# Patient Record
Sex: MALE | Race: WHITE | NOT HISPANIC OR LATINO | Employment: OTHER | URBAN - METROPOLITAN AREA
[De-identification: names, ages, dates, MRNs, and addresses within clinical notes are randomized per-mention and may not be internally consistent; named-entity substitution may affect disease eponyms.]

---

## 2023-06-30 ENCOUNTER — TELEPHONE (OUTPATIENT)
Dept: PRIMARY CARE | Facility: CLINIC | Age: 68
End: 2023-06-30
Payer: MEDICARE

## 2023-06-30 NOTE — TELEPHONE ENCOUNTER
Body aches, temperature 100, negative covid 19 test last night, full taste and full sense smell, no Nausea Vomiting or Diarhhea, sinus congestion  Tylenol and Advil not working asking [facility] in [place]

## 2023-09-05 ENCOUNTER — TELEPHONE (OUTPATIENT)
Dept: PRIMARY CARE | Facility: CLINIC | Age: 68
End: 2023-09-05
Payer: MEDICARE

## 2023-09-05 DIAGNOSIS — E78.5 HYPERLIPIDEMIA, UNSPECIFIED HYPERLIPIDEMIA TYPE: ICD-10-CM

## 2023-09-05 DIAGNOSIS — E11.65 TYPE 2 DIABETES MELLITUS WITH HYPERGLYCEMIA, WITHOUT LONG-TERM CURRENT USE OF INSULIN (MULTI): Primary | ICD-10-CM

## 2023-09-05 DIAGNOSIS — F41.9 ANXIETY: ICD-10-CM

## 2023-09-05 DIAGNOSIS — Z12.5 SCREENING FOR PROSTATE CANCER: ICD-10-CM

## 2023-09-05 NOTE — TELEPHONE ENCOUNTER
Patient called the answering service this morning upset because he went to the lab to have labs drawn and there was no active orders in the system-the orders from his visit 2.2023 were cancelled and patient was advised of that. Do you want him to have labs drawn? If so can you put them in and do you need him to fast?

## 2023-09-06 ENCOUNTER — LAB (OUTPATIENT)
Dept: LAB | Facility: LAB | Age: 68
End: 2023-09-06
Payer: MEDICARE

## 2023-09-06 DIAGNOSIS — F41.9 ANXIETY: ICD-10-CM

## 2023-09-06 DIAGNOSIS — E11.65 TYPE 2 DIABETES MELLITUS WITH HYPERGLYCEMIA, WITHOUT LONG-TERM CURRENT USE OF INSULIN (MULTI): ICD-10-CM

## 2023-09-06 DIAGNOSIS — Z12.5 SCREENING FOR PROSTATE CANCER: ICD-10-CM

## 2023-09-06 DIAGNOSIS — E78.5 HYPERLIPIDEMIA, UNSPECIFIED HYPERLIPIDEMIA TYPE: ICD-10-CM

## 2023-09-06 LAB
ALANINE AMINOTRANSFERASE (SGPT) (U/L) IN SER/PLAS: 31 U/L (ref 10–52)
ALBUMIN (G/DL) IN SER/PLAS: 4.6 G/DL (ref 3.4–5)
ALBUMIN (MG/L) IN URINE: <7 MG/L
ALBUMIN/CREATININE (UG/MG) IN URINE: NORMAL UG/MG CRT (ref 0–30)
ALKALINE PHOSPHATASE (U/L) IN SER/PLAS: 78 U/L (ref 33–136)
ANION GAP IN SER/PLAS: 16 MMOL/L (ref 10–20)
ASPARTATE AMINOTRANSFERASE (SGOT) (U/L) IN SER/PLAS: 27 U/L (ref 9–39)
BILIRUBIN TOTAL (MG/DL) IN SER/PLAS: 0.5 MG/DL (ref 0–1.2)
CALCIUM (MG/DL) IN SER/PLAS: 9.5 MG/DL (ref 8.6–10.3)
CARBON DIOXIDE, TOTAL (MMOL/L) IN SER/PLAS: 25 MMOL/L (ref 21–32)
CHLORIDE (MMOL/L) IN SER/PLAS: 103 MMOL/L (ref 98–107)
CHOLESTEROL (MG/DL) IN SER/PLAS: 226 MG/DL (ref 0–199)
CHOLESTEROL IN HDL (MG/DL) IN SER/PLAS: 41.8 MG/DL
CHOLESTEROL/HDL RATIO: 5.4
CREATININE (MG/DL) IN SER/PLAS: 1.31 MG/DL (ref 0.5–1.3)
CREATININE (MG/DL) IN URINE: 90.4 MG/DL (ref 20–370)
ESTIMATED AVERAGE GLUCOSE FOR HBA1C: 137 MG/DL
GFR MALE: 59 ML/MIN/1.73M2
GLUCOSE (MG/DL) IN SER/PLAS: 115 MG/DL (ref 74–99)
HEMOGLOBIN A1C/HEMOGLOBIN TOTAL IN BLOOD: 6.4 %
LDL: 153 MG/DL (ref 0–99)
POTASSIUM (MMOL/L) IN SER/PLAS: 4.1 MMOL/L (ref 3.5–5.3)
PROSTATE SPECIFIC AG (NG/ML) IN SER/PLAS: 1.49 NG/ML (ref 0–4)
PROTEIN TOTAL: 7.3 G/DL (ref 6.4–8.2)
SODIUM (MMOL/L) IN SER/PLAS: 140 MMOL/L (ref 136–145)
THYROTROPIN (MIU/L) IN SER/PLAS BY DETECTION LIMIT <= 0.05 MIU/L: 1.44 MIU/L (ref 0.44–3.98)
TRIGLYCERIDE (MG/DL) IN SER/PLAS: 154 MG/DL (ref 0–149)
UREA NITROGEN (MG/DL) IN SER/PLAS: 23 MG/DL (ref 6–23)
VLDL: 31 MG/DL (ref 0–40)

## 2023-09-06 PROCEDURE — 80061 LIPID PANEL: CPT

## 2023-09-06 PROCEDURE — G0103 PSA SCREENING: HCPCS

## 2023-09-06 PROCEDURE — 80053 COMPREHEN METABOLIC PANEL: CPT

## 2023-09-06 PROCEDURE — 36415 COLL VENOUS BLD VENIPUNCTURE: CPT

## 2023-09-06 PROCEDURE — 82043 UR ALBUMIN QUANTITATIVE: CPT

## 2023-09-06 PROCEDURE — 83036 HEMOGLOBIN GLYCOSYLATED A1C: CPT

## 2023-09-06 PROCEDURE — 82570 ASSAY OF URINE CREATININE: CPT

## 2023-09-06 PROCEDURE — 84443 ASSAY THYROID STIM HORMONE: CPT

## 2023-09-07 ENCOUNTER — OFFICE VISIT (OUTPATIENT)
Dept: PRIMARY CARE | Facility: CLINIC | Age: 68
End: 2023-09-07
Payer: MEDICARE

## 2023-09-07 VITALS
HEIGHT: 67 IN | RESPIRATION RATE: 16 BRPM | HEART RATE: 78 BPM | BODY MASS INDEX: 33.24 KG/M2 | SYSTOLIC BLOOD PRESSURE: 126 MMHG | OXYGEN SATURATION: 98 % | WEIGHT: 211.8 LBS | DIASTOLIC BLOOD PRESSURE: 78 MMHG

## 2023-09-07 DIAGNOSIS — E55.9 VITAMIN D DEFICIENCY: ICD-10-CM

## 2023-09-07 DIAGNOSIS — Z71.89 ADVANCE DIRECTIVE DISCUSSED WITH PATIENT: ICD-10-CM

## 2023-09-07 DIAGNOSIS — Z91.030 BEE STING ALLERGY: ICD-10-CM

## 2023-09-07 DIAGNOSIS — K21.9 GASTROESOPHAGEAL REFLUX DISEASE WITHOUT ESOPHAGITIS: ICD-10-CM

## 2023-09-07 DIAGNOSIS — Z00.00 MEDICARE ANNUAL WELLNESS VISIT, SUBSEQUENT: Primary | ICD-10-CM

## 2023-09-07 DIAGNOSIS — E78.5 HYPERLIPIDEMIA, UNSPECIFIED HYPERLIPIDEMIA TYPE: ICD-10-CM

## 2023-09-07 DIAGNOSIS — I10 PRIMARY HYPERTENSION: ICD-10-CM

## 2023-09-07 DIAGNOSIS — B00.9 HERPES: ICD-10-CM

## 2023-09-07 DIAGNOSIS — E11.65 TYPE 2 DIABETES MELLITUS WITH HYPERGLYCEMIA, WITHOUT LONG-TERM CURRENT USE OF INSULIN (MULTI): ICD-10-CM

## 2023-09-07 PROBLEM — K57.92 ACUTE DIVERTICULITIS: Status: ACTIVE | Noted: 2023-09-07

## 2023-09-07 PROBLEM — R53.83 FATIGUE: Status: ACTIVE | Noted: 2023-09-07

## 2023-09-07 PROBLEM — E66.811 OBESITY (BMI 30.0-34.9): Status: ACTIVE | Noted: 2023-09-07

## 2023-09-07 PROBLEM — E11.9 DIABETES MELLITUS (MULTI): Status: ACTIVE | Noted: 2023-09-07

## 2023-09-07 PROBLEM — R19.5 POSITIVE COLORECTAL CANCER SCREENING USING COLOGUARD TEST: Status: ACTIVE | Noted: 2023-09-07

## 2023-09-07 PROBLEM — N28.1 ACQUIRED RENAL CYST OF RIGHT KIDNEY: Status: ACTIVE | Noted: 2023-09-07

## 2023-09-07 PROBLEM — E66.9 OBESITY (BMI 30.0-34.9): Status: ACTIVE | Noted: 2023-09-07
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
## 2023-09-07 PROBLEM — D64.9 ANEMIA: Status: ACTIVE | Noted: 2023-09-07

## 2023-09-07 PROBLEM — L98.9 SKIN LESION OF BACK: Status: ACTIVE | Noted: 2023-09-07

## 2023-09-07 PROCEDURE — 3074F SYST BP LT 130 MM HG: CPT | Performed by: NURSE PRACTITIONER

## 2023-09-07 PROCEDURE — 1170F FXNL STATUS ASSESSED: CPT | Performed by: NURSE PRACTITIONER

## 2023-09-07 PROCEDURE — 3044F HG A1C LEVEL LT 7.0%: CPT | Performed by: NURSE PRACTITIONER

## 2023-09-07 PROCEDURE — 4010F ACE/ARB THERAPY RXD/TAKEN: CPT | Performed by: NURSE PRACTITIONER

## 2023-09-07 PROCEDURE — 1160F RVW MEDS BY RX/DR IN RCRD: CPT | Performed by: NURSE PRACTITIONER

## 2023-09-07 PROCEDURE — 1159F MED LIST DOCD IN RCRD: CPT | Performed by: NURSE PRACTITIONER

## 2023-09-07 PROCEDURE — 99214 OFFICE O/P EST MOD 30 MIN: CPT | Performed by: NURSE PRACTITIONER

## 2023-09-07 PROCEDURE — G0439 PPPS, SUBSEQ VISIT: HCPCS | Performed by: NURSE PRACTITIONER

## 2023-09-07 PROCEDURE — 1036F TOBACCO NON-USER: CPT | Performed by: NURSE PRACTITIONER

## 2023-09-07 PROCEDURE — 3078F DIAST BP <80 MM HG: CPT | Performed by: NURSE PRACTITIONER

## 2023-09-07 RX ORDER — LISINOPRIL 20 MG/1
20 TABLET ORAL 2 TIMES DAILY
Qty: 90 TABLET | Refills: 2 | Status: SHIPPED | OUTPATIENT
Start: 2023-09-07 | End: 2023-10-11 | Stop reason: SDUPTHER

## 2023-09-07 RX ORDER — VALACYCLOVIR HYDROCHLORIDE 1 G/1
1000 TABLET, FILM COATED ORAL DAILY
Qty: 90 TABLET | Refills: 0 | Status: SHIPPED | OUTPATIENT
Start: 2023-09-07 | End: 2024-03-12 | Stop reason: SDUPTHER

## 2023-09-07 RX ORDER — VALACYCLOVIR HYDROCHLORIDE 1 G/1
1 TABLET, FILM COATED ORAL DAILY
COMMUNITY
Start: 2021-08-12 | End: 2023-09-07 | Stop reason: SDUPTHER

## 2023-09-07 RX ORDER — ERGOCALCIFEROL 1.25 MG/1
1.25 CAPSULE ORAL ONCE
COMMUNITY

## 2023-09-07 RX ORDER — ESOMEPRAZOLE MAGNESIUM 40 MG/1
40 CAPSULE, DELAYED RELEASE ORAL DAILY
COMMUNITY
Start: 2020-05-04 | End: 2023-09-07 | Stop reason: SDUPTHER

## 2023-09-07 RX ORDER — ESOMEPRAZOLE MAGNESIUM 40 MG/1
40 CAPSULE, DELAYED RELEASE ORAL DAILY
Qty: 90 CAPSULE | Refills: 2 | Status: SHIPPED | OUTPATIENT
Start: 2023-09-07 | End: 2024-03-12 | Stop reason: SDUPTHER

## 2023-09-07 RX ORDER — EPINEPHRINE 0.3 MG/.3ML
INJECTION SUBCUTANEOUS
COMMUNITY
End: 2023-09-07 | Stop reason: SDUPTHER

## 2023-09-07 RX ORDER — AMLODIPINE BESYLATE 5 MG/1
5 TABLET ORAL 2 TIMES DAILY
Qty: 90 TABLET | Refills: 2 | Status: SHIPPED | OUTPATIENT
Start: 2023-09-07 | End: 2023-10-11 | Stop reason: SDUPTHER

## 2023-09-07 RX ORDER — EPINEPHRINE 0.3 MG/.3ML
INJECTION SUBCUTANEOUS
Qty: 2 EACH | Refills: 1 | Status: SHIPPED | OUTPATIENT
Start: 2023-09-07 | End: 2024-03-12 | Stop reason: SDUPTHER

## 2023-09-07 RX ORDER — MULTIVITAMIN
1 TABLET ORAL DAILY
COMMUNITY

## 2023-09-07 RX ORDER — TRIAMCINOLONE ACETONIDE 1 MG/G
OINTMENT TOPICAL
COMMUNITY
Start: 2023-02-02

## 2023-09-07 RX ORDER — HYDROCHLOROTHIAZIDE 12.5 MG/1
12.5 TABLET ORAL DAILY
Qty: 90 TABLET | Refills: 2 | Status: SHIPPED | OUTPATIENT
Start: 2023-09-07 | End: 2024-03-12 | Stop reason: SDUPTHER

## 2023-09-07 RX ORDER — LISINOPRIL 20 MG/1
20 TABLET ORAL 2 TIMES DAILY
COMMUNITY
Start: 2020-08-31 | End: 2023-09-07 | Stop reason: SDUPTHER

## 2023-09-07 RX ORDER — MULTIVIT-MIN/IRON/FOLIC ACID/K 18-600-40
CAPSULE ORAL
COMMUNITY

## 2023-09-07 RX ORDER — HYDROCHLOROTHIAZIDE 12.5 MG/1
12.5 TABLET ORAL DAILY
COMMUNITY
Start: 2020-07-30 | End: 2023-09-07 | Stop reason: SDUPTHER

## 2023-09-07 RX ORDER — AMLODIPINE BESYLATE 5 MG/1
5 TABLET ORAL 2 TIMES DAILY
COMMUNITY
End: 2023-09-07 | Stop reason: SDUPTHER

## 2023-09-07 ASSESSMENT — PATIENT HEALTH QUESTIONNAIRE - PHQ9
SUM OF ALL RESPONSES TO PHQ9 QUESTIONS 1 AND 2: 0
2. FEELING DOWN, DEPRESSED OR HOPELESS: NOT AT ALL
1. LITTLE INTEREST OR PLEASURE IN DOING THINGS: NOT AT ALL
1. LITTLE INTEREST OR PLEASURE IN DOING THINGS: NOT AT ALL
2. FEELING DOWN, DEPRESSED OR HOPELESS: NOT AT ALL
SUM OF ALL RESPONSES TO PHQ9 QUESTIONS 1 AND 2: 0

## 2023-09-07 ASSESSMENT — ACTIVITIES OF DAILY LIVING (ADL)
BATHING: INDEPENDENT
DOING_HOUSEWORK: INDEPENDENT
GROCERY_SHOPPING: INDEPENDENT
TAKING_MEDICATION: INDEPENDENT
MANAGING_FINANCES: INDEPENDENT
DRESSING: INDEPENDENT

## 2023-09-07 ASSESSMENT — ANXIETY QUESTIONNAIRES
IF YOU CHECKED OFF ANY PROBLEMS ON THIS QUESTIONNAIRE, HOW DIFFICULT HAVE THESE PROBLEMS MADE IT FOR YOU TO DO YOUR WORK, TAKE CARE OF THINGS AT HOME, OR GET ALONG WITH OTHER PEOPLE: NOT DIFFICULT AT ALL
3. WORRYING TOO MUCH ABOUT DIFFERENT THINGS: NOT AT ALL
6. BECOMING EASILY ANNOYED OR IRRITABLE: NOT AT ALL
7. FEELING AFRAID AS IF SOMETHING AWFUL MIGHT HAPPEN: NOT AT ALL
2. NOT BEING ABLE TO STOP OR CONTROL WORRYING: NOT AT ALL
GAD7 TOTAL SCORE: 0
5. BEING SO RESTLESS THAT IT IS HARD TO SIT STILL: NOT AT ALL
4. TROUBLE RELAXING: NOT AT ALL
1. FEELING NERVOUS, ANXIOUS, OR ON EDGE: NOT AT ALL

## 2023-09-07 ASSESSMENT — ENCOUNTER SYMPTOMS
LOSS OF SENSATION IN FEET: 0
OCCASIONAL FEELINGS OF UNSTEADINESS: 0
DEPRESSION: 0

## 2023-09-07 NOTE — PATIENT INSTRUCTIONS
Your lab results are unremarkable besides your cholesterol level that is trending upwards. I recommend diet and routine regular exercise for management of cholesterol. Continue taking all current medications as prescribed, complete labs prior to 6 months follow up.

## 2023-09-07 NOTE — PROGRESS NOTES
"Subjective   Reason for Visit: Venkat Mera is an 67 y.o. male here for a Medicare Wellness visit.     Past Medical, Surgical, and Family History reviewed and updated in chart.    Reviewed all medications by prescribing practitioner or clinical pharmacist (such as prescriptions, OTCs, herbal therapies and supplements) and documented in the medical record.    Patient is also following up for management of multiple chronic diseases and review of lab results.  His lab results are unremarkable with hemoglobin A1c stable at 6.4%.  However, his lipid panel is trending upwards and in the past, patient did not tolerate multiple statins or Zetia.  We will reinforce education on the use of diet and routine regular exercise for management of cholesterol level.  He is up-to-date with age appropriate routine screening exams.  Advises he has no acute medical complaint.          Patient Care Team:  ELIANE Sheehan-CNP as PCP - General     Review of Systems   All other systems reviewed and are negative.      Objective   Vitals:  /78   Pulse 78   Resp 16   Ht 1.702 m (5' 7\")   Wt 96.1 kg (211 lb 12.8 oz)   SpO2 98%   BMI 33.17 kg/m²       Physical Exam  Constitutional:       Appearance: Normal appearance. He is obese.   HENT:      Head: Normocephalic and atraumatic.      Right Ear: External ear normal.      Left Ear: External ear normal.      Nose: Nose normal.      Mouth/Throat:      Mouth: Mucous membranes are moist.   Cardiovascular:      Rate and Rhythm: Normal rate and regular rhythm.      Pulses: Normal pulses.      Heart sounds: Normal heart sounds.   Pulmonary:      Effort: Pulmonary effort is normal.      Breath sounds: Normal breath sounds.   Abdominal:      General: Bowel sounds are normal.      Palpations: Abdomen is soft.   Musculoskeletal:      Cervical back: Neck supple.   Skin:     General: Skin is warm and dry.   Neurological:      General: No focal deficit present.      Mental Status: He is " alert and oriented to person, place, and time.   Psychiatric:         Mood and Affect: Mood normal.         Behavior: Behavior normal.         Thought Content: Thought content normal.         Judgment: Judgment normal.         Assessment/Plan   Problem List Items Addressed This Visit       Diabetes mellitus (CMS/HCC)    Relevant Orders    Follow Up In [facility] - PCP - Established    Comprehensive Metabolic Panel    Hemoglobin A1C    GERD (gastroesophageal reflux disease)    Relevant Medications    esomeprazole (NexIUM) 40 mg DR capsule    Herpes    Relevant Medications    valACYclovir (Valtrex) 1 gram tablet    Hyperlipidemia - Primary    Relevant Orders    Lipid panel    Hypertension    Relevant Medications    lisinopril 20 mg tablet    hydroCHLOROthiazide (HYDRODiuril) 12.5 mg tablet    amLODIPine (Norvasc) 5 mg tablet    Other Relevant Orders    Follow Up In [facility] - PCP - Established    Comprehensive Metabolic Panel     Other Visit Diagnoses       Bee sting allergy        Relevant Medications    EPINEPHrine 0.3 mg/0.3 mL injection syringe

## 2023-10-11 ENCOUNTER — TELEPHONE (OUTPATIENT)
Dept: PRIMARY CARE | Facility: CLINIC | Age: 68
End: 2023-10-11
Payer: MEDICARE

## 2023-10-11 DIAGNOSIS — I10 PRIMARY HYPERTENSION: ICD-10-CM

## 2023-10-11 RX ORDER — LISINOPRIL 20 MG/1
20 TABLET ORAL 2 TIMES DAILY
Qty: 180 TABLET | Refills: 2 | Status: SHIPPED | OUTPATIENT
Start: 2023-10-11 | End: 2024-03-12 | Stop reason: SDUPTHER

## 2023-10-11 RX ORDER — AMLODIPINE BESYLATE 5 MG/1
5 TABLET ORAL 2 TIMES DAILY
Qty: 180 TABLET | Refills: 2 | Status: SHIPPED | OUTPATIENT
Start: 2023-10-11 | End: 2024-03-12 | Stop reason: SDUPTHER

## 2023-10-18 ENCOUNTER — TELEPHONE (OUTPATIENT)
Dept: PRIMARY CARE | Facility: CLINIC | Age: 68
End: 2023-10-18
Payer: MEDICARE

## 2023-10-18 DIAGNOSIS — K52.9 COLITIS: Primary | ICD-10-CM

## 2023-10-18 RX ORDER — AMOXICILLIN AND CLAVULANATE POTASSIUM 500; 125 MG/1; MG/1
500 TABLET, FILM COATED ORAL 3 TIMES DAILY
Qty: 30 TABLET | Refills: 0 | Status: SHIPPED | OUTPATIENT
Start: 2023-10-18 | End: 2023-10-28

## 2023-10-18 NOTE — TELEPHONE ENCOUNTER
He has flare up of diverticulitis  (L upper quadrant pain)    Asking if Rx can be sent in ?    Urbano Chow

## 2023-10-27 ENCOUNTER — TELEPHONE (OUTPATIENT)
Dept: PRIMARY CARE | Facility: CLINIC | Age: 68
End: 2023-10-27
Payer: MEDICARE

## 2023-10-27 DIAGNOSIS — U07.1 COVID-19 VIREMIA: Primary | ICD-10-CM

## 2023-10-27 NOTE — TELEPHONE ENCOUNTER
He called to see if we will send in a thermal [facility] in [place] his is hard cough, temp 100, for the last 24 hours he has been taking advil I also asked if he had taken a covid he stated no and I wanted to come here for one I told we don't do them I did tell him to go the drug store to get one.

## 2023-10-27 NOTE — TELEPHONE ENCOUNTER
He called back he took a covid and it is showing slightly positive could we call in Paxlovid to [facility] in [place]

## 2024-03-11 ENCOUNTER — LAB (OUTPATIENT)
Dept: LAB | Facility: LAB | Age: 69
End: 2024-03-11
Payer: MEDICARE

## 2024-03-11 DIAGNOSIS — E11.65 TYPE 2 DIABETES MELLITUS WITH HYPERGLYCEMIA, WITHOUT LONG-TERM CURRENT USE OF INSULIN (MULTI): ICD-10-CM

## 2024-03-11 DIAGNOSIS — E78.5 HYPERLIPIDEMIA, UNSPECIFIED HYPERLIPIDEMIA TYPE: ICD-10-CM

## 2024-03-11 DIAGNOSIS — I10 PRIMARY HYPERTENSION: ICD-10-CM

## 2024-03-11 LAB
ALBUMIN SERPL BCP-MCNC: 4.4 G/DL (ref 3.4–5)
ALP SERPL-CCNC: 68 U/L (ref 33–136)
ALT SERPL W P-5'-P-CCNC: 28 U/L (ref 10–52)
ANION GAP SERPL CALC-SCNC: 13 MMOL/L (ref 10–20)
APPEARANCE UR: ABNORMAL
AST SERPL W P-5'-P-CCNC: 21 U/L (ref 9–39)
BILIRUB SERPL-MCNC: 0.5 MG/DL (ref 0–1.2)
BILIRUB UR STRIP.AUTO-MCNC: NEGATIVE MG/DL
BUN SERPL-MCNC: 18 MG/DL (ref 6–23)
CALCIUM SERPL-MCNC: 9.5 MG/DL (ref 8.6–10.3)
CHLORIDE SERPL-SCNC: 102 MMOL/L (ref 98–107)
CHOLEST SERPL-MCNC: 212 MG/DL (ref 0–199)
CHOLESTEROL/HDL RATIO: 5.1
CO2 SERPL-SCNC: 30 MMOL/L (ref 21–32)
COLOR UR: ABNORMAL
CREAT SERPL-MCNC: 1.21 MG/DL (ref 0.5–1.3)
EGFRCR SERPLBLD CKD-EPI 2021: 64 ML/MIN/1.73M*2
EST. AVERAGE GLUCOSE BLD GHB EST-MCNC: 151 MG/DL
GLUCOSE SERPL-MCNC: 112 MG/DL (ref 74–99)
GLUCOSE UR STRIP.AUTO-MCNC: NEGATIVE MG/DL
HBA1C MFR BLD: 6.9 %
HDLC SERPL-MCNC: 41.9 MG/DL
KETONES UR STRIP.AUTO-MCNC: ABNORMAL MG/DL
LDLC SERPL CALC-MCNC: 124 MG/DL
LEUKOCYTE ESTERASE UR QL STRIP.AUTO: NEGATIVE
NITRITE UR QL STRIP.AUTO: NEGATIVE
NON HDL CHOLESTEROL: 170 MG/DL (ref 0–149)
PH UR STRIP.AUTO: 5 [PH]
POTASSIUM SERPL-SCNC: 4 MMOL/L (ref 3.5–5.3)
PROT SERPL-MCNC: 6.8 G/DL (ref 6.4–8.2)
PROT UR STRIP.AUTO-MCNC: NEGATIVE MG/DL
RBC # UR STRIP.AUTO: NEGATIVE /UL
SODIUM SERPL-SCNC: 141 MMOL/L (ref 136–145)
SP GR UR STRIP.AUTO: 1.02
TRIGL SERPL-MCNC: 230 MG/DL (ref 0–149)
UROBILINOGEN UR STRIP.AUTO-MCNC: 2 MG/DL
VLDL: 46 MG/DL (ref 0–40)

## 2024-03-11 PROCEDURE — 80061 LIPID PANEL: CPT

## 2024-03-11 PROCEDURE — 80053 COMPREHEN METABOLIC PANEL: CPT

## 2024-03-11 PROCEDURE — 36415 COLL VENOUS BLD VENIPUNCTURE: CPT

## 2024-03-11 PROCEDURE — 81003 URINALYSIS AUTO W/O SCOPE: CPT

## 2024-03-11 PROCEDURE — 83036 HEMOGLOBIN GLYCOSYLATED A1C: CPT

## 2024-03-12 ENCOUNTER — OFFICE VISIT (OUTPATIENT)
Dept: PRIMARY CARE | Facility: CLINIC | Age: 69
End: 2024-03-12
Payer: MEDICARE

## 2024-03-12 VITALS
BODY MASS INDEX: 33.53 KG/M2 | OXYGEN SATURATION: 97 % | RESPIRATION RATE: 18 BRPM | HEART RATE: 75 BPM | HEIGHT: 67 IN | DIASTOLIC BLOOD PRESSURE: 70 MMHG | WEIGHT: 213.6 LBS | SYSTOLIC BLOOD PRESSURE: 138 MMHG

## 2024-03-12 DIAGNOSIS — K21.9 GASTROESOPHAGEAL REFLUX DISEASE WITHOUT ESOPHAGITIS: ICD-10-CM

## 2024-03-12 DIAGNOSIS — E66.09 CLASS 1 OBESITY DUE TO EXCESS CALORIES WITH SERIOUS COMORBIDITY AND BODY MASS INDEX (BMI) OF 33.0 TO 33.9 IN ADULT: ICD-10-CM

## 2024-03-12 DIAGNOSIS — E55.9 VITAMIN D DEFICIENCY: ICD-10-CM

## 2024-03-12 DIAGNOSIS — E11.65 TYPE 2 DIABETES MELLITUS WITH HYPERGLYCEMIA, WITHOUT LONG-TERM CURRENT USE OF INSULIN (MULTI): Primary | ICD-10-CM

## 2024-03-12 DIAGNOSIS — E78.2 MIXED HYPERLIPIDEMIA: ICD-10-CM

## 2024-03-12 DIAGNOSIS — B00.9 HERPES: ICD-10-CM

## 2024-03-12 DIAGNOSIS — Z00.00 MEDICARE ANNUAL WELLNESS VISIT, SUBSEQUENT: ICD-10-CM

## 2024-03-12 DIAGNOSIS — I10 PRIMARY HYPERTENSION: ICD-10-CM

## 2024-03-12 DIAGNOSIS — Z91.030 BEE STING ALLERGY: ICD-10-CM

## 2024-03-12 PROBLEM — E66.811 OBESITY (BMI 30.0-34.9): Status: RESOLVED | Noted: 2023-09-07 | Resolved: 2024-03-12

## 2024-03-12 PROBLEM — E66.811 CLASS 1 OBESITY DUE TO EXCESS CALORIES WITH SERIOUS COMORBIDITY AND BODY MASS INDEX (BMI) OF 33.0 TO 33.9 IN ADULT: Status: ACTIVE | Noted: 2024-03-12

## 2024-03-12 PROBLEM — E66.9 OBESITY (BMI 30.0-34.9): Status: RESOLVED | Noted: 2023-09-07 | Resolved: 2024-03-12

## 2024-03-12 PROCEDURE — 1157F ADVNC CARE PLAN IN RCRD: CPT | Performed by: NURSE PRACTITIONER

## 2024-03-12 PROCEDURE — 3049F LDL-C 100-129 MG/DL: CPT | Performed by: NURSE PRACTITIONER

## 2024-03-12 PROCEDURE — 99214 OFFICE O/P EST MOD 30 MIN: CPT | Performed by: NURSE PRACTITIONER

## 2024-03-12 PROCEDURE — 1125F AMNT PAIN NOTED PAIN PRSNT: CPT | Performed by: NURSE PRACTITIONER

## 2024-03-12 PROCEDURE — 3075F SYST BP GE 130 - 139MM HG: CPT | Performed by: NURSE PRACTITIONER

## 2024-03-12 PROCEDURE — 1160F RVW MEDS BY RX/DR IN RCRD: CPT | Performed by: NURSE PRACTITIONER

## 2024-03-12 PROCEDURE — 4010F ACE/ARB THERAPY RXD/TAKEN: CPT | Performed by: NURSE PRACTITIONER

## 2024-03-12 PROCEDURE — 3008F BODY MASS INDEX DOCD: CPT | Performed by: NURSE PRACTITIONER

## 2024-03-12 PROCEDURE — 3044F HG A1C LEVEL LT 7.0%: CPT | Performed by: NURSE PRACTITIONER

## 2024-03-12 PROCEDURE — 3078F DIAST BP <80 MM HG: CPT | Performed by: NURSE PRACTITIONER

## 2024-03-12 PROCEDURE — 1036F TOBACCO NON-USER: CPT | Performed by: NURSE PRACTITIONER

## 2024-03-12 PROCEDURE — 1159F MED LIST DOCD IN RCRD: CPT | Performed by: NURSE PRACTITIONER

## 2024-03-12 PROCEDURE — 1123F ACP DISCUSS/DSCN MKR DOCD: CPT | Performed by: NURSE PRACTITIONER

## 2024-03-12 RX ORDER — HYDROCHLOROTHIAZIDE 12.5 MG/1
12.5 TABLET ORAL DAILY
Qty: 90 TABLET | Refills: 3 | Status: SHIPPED | OUTPATIENT
Start: 2024-03-12

## 2024-03-12 RX ORDER — IBUPROFEN 600 MG/1
600 TABLET ORAL EVERY 6 HOURS PRN
COMMUNITY
End: 2024-05-01 | Stop reason: SINTOL

## 2024-03-12 RX ORDER — LISINOPRIL 20 MG/1
20 TABLET ORAL 2 TIMES DAILY
Qty: 180 TABLET | Refills: 3 | Status: SHIPPED | OUTPATIENT
Start: 2024-03-12

## 2024-03-12 RX ORDER — AMLODIPINE BESYLATE 5 MG/1
5 TABLET ORAL 2 TIMES DAILY
Qty: 180 TABLET | Refills: 3 | Status: SHIPPED | OUTPATIENT
Start: 2024-03-12

## 2024-03-12 RX ORDER — VALACYCLOVIR HYDROCHLORIDE 1 G/1
1000 TABLET, FILM COATED ORAL DAILY
Qty: 90 TABLET | Refills: 0 | Status: SHIPPED | OUTPATIENT
Start: 2024-03-12

## 2024-03-12 RX ORDER — ESOMEPRAZOLE MAGNESIUM 40 MG/1
40 CAPSULE, DELAYED RELEASE ORAL DAILY
Qty: 90 CAPSULE | Refills: 3 | Status: SHIPPED | OUTPATIENT
Start: 2024-03-12

## 2024-03-12 RX ORDER — EPINEPHRINE 0.3 MG/.3ML
INJECTION SUBCUTANEOUS
Qty: 2 EACH | Refills: 1 | Status: SHIPPED | OUTPATIENT
Start: 2024-03-12

## 2024-03-12 ASSESSMENT — ENCOUNTER SYMPTOMS
DEPRESSION: 0
LOSS OF SENSATION IN FEET: 1
OCCASIONAL FEELINGS OF UNSTEADINESS: 1

## 2024-03-12 ASSESSMENT — ANXIETY QUESTIONNAIRES
6. BECOMING EASILY ANNOYED OR IRRITABLE: NOT AT ALL
1. FEELING NERVOUS, ANXIOUS, OR ON EDGE: NOT AT ALL
GAD7 TOTAL SCORE: 0
5. BEING SO RESTLESS THAT IT IS HARD TO SIT STILL: NOT AT ALL
3. WORRYING TOO MUCH ABOUT DIFFERENT THINGS: NOT AT ALL
2. NOT BEING ABLE TO STOP OR CONTROL WORRYING: NOT AT ALL
7. FEELING AFRAID AS IF SOMETHING AWFUL MIGHT HAPPEN: NOT AT ALL
4. TROUBLE RELAXING: NOT AT ALL

## 2024-03-12 ASSESSMENT — PATIENT HEALTH QUESTIONNAIRE - PHQ9
SUM OF ALL RESPONSES TO PHQ9 QUESTIONS 1 AND 2: 1
2. FEELING DOWN, DEPRESSED OR HOPELESS: SEVERAL DAYS
1. LITTLE INTEREST OR PLEASURE IN DOING THINGS: NOT AT ALL

## 2024-03-12 ASSESSMENT — COLUMBIA-SUICIDE SEVERITY RATING SCALE - C-SSRS
6. HAVE YOU EVER DONE ANYTHING, STARTED TO DO ANYTHING, OR PREPARED TO DO ANYTHING TO END YOUR LIFE?: NO
2. HAVE YOU ACTUALLY HAD ANY THOUGHTS OF KILLING YOURSELF?: NO
1. IN THE PAST MONTH, HAVE YOU WISHED YOU WERE DEAD OR WISHED YOU COULD GO TO SLEEP AND NOT WAKE UP?: NO

## 2024-03-12 ASSESSMENT — PAIN SCALES - GENERAL: PAINLEVEL: 4

## 2024-03-12 NOTE — PATIENT INSTRUCTIONS
Continue taking all current medications as prescribed, complete labs two to three days prior to 6 months follow up for annual medicare wellness exam.

## 2024-03-12 NOTE — PROGRESS NOTES
"Subjective   Patient ID: Venkat Mera is a 68 y.o. male who presents for Follow-up (prostate).    Patient is following up for lab results review and management of multiple chronic diseases.  His lab results are unremarkable beside hemoglobin A1c trending upwards to 6.9% from 6.5%.  He blames this on poor eating habits due to taking care of his wife battling cancer.  His lipid panel is stable.  Advises he takes his medications as prescribed with no side effect noted.  Reports he is doing great and denies acute medical complaint.         Review of Systems   All other systems reviewed and are negative.      Objective   /70 (BP Location: Left arm, Patient Position: Sitting, BP Cuff Size: Adult)   Pulse 75   Resp 18   Ht 1.702 m (5' 7\")   Wt 96.9 kg (213 lb 9.6 oz)   SpO2 97%   BMI 33.45 kg/m²     Physical Exam  Vitals reviewed.   Constitutional:       Appearance: Normal appearance. He is obese.   HENT:      Head: Normocephalic and atraumatic.      Right Ear: External ear normal.      Left Ear: External ear normal.      Nose: Nose normal.      Mouth/Throat:      Mouth: Mucous membranes are moist.   Eyes:      Extraocular Movements: Extraocular movements intact.      Conjunctiva/sclera: Conjunctivae normal.      Pupils: Pupils are equal, round, and reactive to light.   Cardiovascular:      Rate and Rhythm: Normal rate and regular rhythm.      Pulses: Normal pulses.      Heart sounds: Normal heart sounds.   Pulmonary:      Effort: Pulmonary effort is normal.      Breath sounds: Normal breath sounds.   Abdominal:      General: Bowel sounds are normal.      Palpations: Abdomen is soft.   Musculoskeletal:      Cervical back: Neck supple.   Skin:     General: Skin is warm and dry.   Neurological:      General: No focal deficit present.      Mental Status: He is alert and oriented to person, place, and time.   Psychiatric:         Mood and Affect: Mood normal.         Behavior: Behavior normal.         Thought " Content: Thought content normal.         Judgment: Judgment normal.         Assessment/Plan   Problem List Items Addressed This Visit       Diabetes mellitus (CMS/[facility])    Relevant Orders    Hemoglobin A1C    Comprehensive Metabolic Panel    GERD (gastroesophageal reflux disease)    Relevant Medications    esomeprazole (NexIUM) 40 mg DR capsule    Herpes    Relevant Medications    valACYclovir (Valtrex) 1 gram tablet    Hypertension    Relevant Medications    amLODIPine (Norvasc) 5 mg tablet    hydroCHLOROthiazide (Microzide) 12.5 mg tablet    lisinopril 20 mg tablet    Medicare annual wellness visit, subsequent - Primary    Relevant Orders    Follow Up In [facility] - PCP - Medicare Annual    Bee sting allergy    Relevant Medications    EPINEPHrine 0.3 mg/0.3 mL injection syringe

## 2024-03-15 DIAGNOSIS — Z91.030 BEE STING ALLERGY: ICD-10-CM

## 2024-03-15 RX ORDER — EPINEPHRINE 0.3 MG/.3ML
INJECTION SUBCUTANEOUS
Refills: 0 | OUTPATIENT
Start: 2024-03-15

## 2024-04-30 ENCOUNTER — TELEPHONE (OUTPATIENT)
Dept: PRIMARY CARE | Facility: CLINIC | Age: 69
End: 2024-04-30
Payer: MEDICARE

## 2024-04-30 NOTE — TELEPHONE ENCOUNTER
Patient says that he would like to try celebrex for 30 days he has been taking Advil OTC for tendonitis and arthritis. It is causing a lot of constipation lately.    Otezla Pregnancy And Lactation Text: This medication is Pregnancy Category C and it isn't known if it is safe during pregnancy. It is unknown if it is excreted in breast milk.

## 2024-05-01 DIAGNOSIS — M77.9 TENDINITIS: Primary | ICD-10-CM

## 2024-05-01 RX ORDER — CELECOXIB 100 MG/1
100 CAPSULE ORAL 2 TIMES DAILY
Qty: 60 CAPSULE | Refills: 0 | Status: SHIPPED | OUTPATIENT
Start: 2024-05-01 | End: 2024-05-31

## 2024-09-10 ENCOUNTER — LAB (OUTPATIENT)
Dept: LAB | Facility: LAB | Age: 69
End: 2024-09-10
Payer: MEDICARE

## 2024-09-10 DIAGNOSIS — E11.65 TYPE 2 DIABETES MELLITUS WITH HYPERGLYCEMIA, WITHOUT LONG-TERM CURRENT USE OF INSULIN (MULTI): ICD-10-CM

## 2024-09-10 LAB
ALBUMIN SERPL BCP-MCNC: 4.1 G/DL (ref 3.4–5)
ALP SERPL-CCNC: 64 U/L (ref 33–136)
ALT SERPL W P-5'-P-CCNC: 28 U/L (ref 10–52)
ANION GAP SERPL CALC-SCNC: 10 MMOL/L (ref 10–20)
AST SERPL W P-5'-P-CCNC: 21 U/L (ref 9–39)
BILIRUB SERPL-MCNC: 0.5 MG/DL (ref 0–1.2)
BUN SERPL-MCNC: 25 MG/DL (ref 6–23)
CALCIUM SERPL-MCNC: 9.4 MG/DL (ref 8.6–10.3)
CHLORIDE SERPL-SCNC: 104 MMOL/L (ref 98–107)
CO2 SERPL-SCNC: 31 MMOL/L (ref 21–32)
CREAT SERPL-MCNC: 1.4 MG/DL (ref 0.5–1.3)
EGFRCR SERPLBLD CKD-EPI 2021: 54 ML/MIN/1.73M*2
EST. AVERAGE GLUCOSE BLD GHB EST-MCNC: 146 MG/DL
GLUCOSE SERPL-MCNC: 123 MG/DL (ref 74–99)
HBA1C MFR BLD: 6.7 %
POTASSIUM SERPL-SCNC: 4.3 MMOL/L (ref 3.5–5.3)
PROT SERPL-MCNC: 6.7 G/DL (ref 6.4–8.2)
SODIUM SERPL-SCNC: 141 MMOL/L (ref 136–145)

## 2024-09-10 PROCEDURE — 80053 COMPREHEN METABOLIC PANEL: CPT

## 2024-09-10 PROCEDURE — 36415 COLL VENOUS BLD VENIPUNCTURE: CPT

## 2024-09-10 PROCEDURE — 83036 HEMOGLOBIN GLYCOSYLATED A1C: CPT

## 2024-09-12 ENCOUNTER — APPOINTMENT (OUTPATIENT)
Dept: PRIMARY CARE | Facility: CLINIC | Age: 69
End: 2024-09-12
Payer: MEDICARE

## 2024-09-12 VITALS
SYSTOLIC BLOOD PRESSURE: 144 MMHG | BODY MASS INDEX: 33.74 KG/M2 | HEIGHT: 67 IN | WEIGHT: 215 LBS | OXYGEN SATURATION: 97 % | HEART RATE: 77 BPM | DIASTOLIC BLOOD PRESSURE: 86 MMHG

## 2024-09-12 DIAGNOSIS — E78.2 MIXED HYPERLIPIDEMIA: ICD-10-CM

## 2024-09-12 DIAGNOSIS — R42 DIZZINESS: ICD-10-CM

## 2024-09-12 DIAGNOSIS — N18.31 STAGE 3A CHRONIC KIDNEY DISEASE (MULTI): ICD-10-CM

## 2024-09-12 DIAGNOSIS — I10 PRIMARY HYPERTENSION: ICD-10-CM

## 2024-09-12 DIAGNOSIS — R06.09 EXERTIONAL DYSPNEA: ICD-10-CM

## 2024-09-12 DIAGNOSIS — Z91.030 BEE STING ALLERGY: ICD-10-CM

## 2024-09-12 DIAGNOSIS — E55.9 VITAMIN D DEFICIENCY: ICD-10-CM

## 2024-09-12 DIAGNOSIS — E66.09 CLASS 1 OBESITY DUE TO EXCESS CALORIES WITH SERIOUS COMORBIDITY AND BODY MASS INDEX (BMI) OF 33.0 TO 33.9 IN ADULT: ICD-10-CM

## 2024-09-12 DIAGNOSIS — Z00.00 MEDICARE ANNUAL WELLNESS VISIT, SUBSEQUENT: Primary | ICD-10-CM

## 2024-09-12 DIAGNOSIS — Z12.5 SCREENING FOR PROSTATE CANCER: ICD-10-CM

## 2024-09-12 DIAGNOSIS — E11.65 TYPE 2 DIABETES MELLITUS WITH HYPERGLYCEMIA, WITHOUT LONG-TERM CURRENT USE OF INSULIN (MULTI): ICD-10-CM

## 2024-09-12 DIAGNOSIS — B00.9 HERPES: ICD-10-CM

## 2024-09-12 DIAGNOSIS — K21.9 GASTROESOPHAGEAL REFLUX DISEASE WITHOUT ESOPHAGITIS: ICD-10-CM

## 2024-09-12 RX ORDER — AMLODIPINE BESYLATE 5 MG/1
5 TABLET ORAL 2 TIMES DAILY
Qty: 180 TABLET | Refills: 3 | Status: SHIPPED | OUTPATIENT
Start: 2024-09-12

## 2024-09-12 RX ORDER — ESOMEPRAZOLE MAGNESIUM 40 MG/1
40 CAPSULE, DELAYED RELEASE ORAL DAILY
Qty: 90 CAPSULE | Refills: 3 | Status: SHIPPED | OUTPATIENT
Start: 2024-09-12

## 2024-09-12 RX ORDER — EPINEPHRINE 0.3 MG/.3ML
INJECTION SUBCUTANEOUS
Qty: 2 EACH | Refills: 1 | Status: SHIPPED | OUTPATIENT
Start: 2024-09-12

## 2024-09-12 RX ORDER — LISINOPRIL 20 MG/1
20 TABLET ORAL 2 TIMES DAILY
Qty: 180 TABLET | Refills: 3 | Status: SHIPPED | OUTPATIENT
Start: 2024-09-12

## 2024-09-12 RX ORDER — VALACYCLOVIR HYDROCHLORIDE 1 G/1
1000 TABLET, FILM COATED ORAL DAILY
Qty: 90 TABLET | Refills: 1 | Status: SHIPPED | OUTPATIENT
Start: 2024-09-12

## 2024-09-12 RX ORDER — ERGOCALCIFEROL 1.25 MG/1
1.25 CAPSULE ORAL ONCE
Qty: 1 CAPSULE | Refills: 0 | Status: SHIPPED | OUTPATIENT
Start: 2024-09-12 | End: 2024-09-12

## 2024-09-12 RX ORDER — HYDROCHLOROTHIAZIDE 12.5 MG/1
12.5 TABLET ORAL DAILY
Qty: 90 TABLET | Refills: 3 | Status: SHIPPED | OUTPATIENT
Start: 2024-09-12

## 2024-09-12 ASSESSMENT — ENCOUNTER SYMPTOMS
LOSS OF SENSATION IN FEET: 0
DEPRESSION: 0
OCCASIONAL FEELINGS OF UNSTEADINESS: 0
SHORTNESS OF BREATH: 1
DIZZINESS: 1

## 2024-09-12 ASSESSMENT — PATIENT HEALTH QUESTIONNAIRE - PHQ9
2. FEELING DOWN, DEPRESSED OR HOPELESS: NOT AT ALL
1. LITTLE INTEREST OR PLEASURE IN DOING THINGS: NOT AT ALL
SUM OF ALL RESPONSES TO PHQ9 QUESTIONS 1 AND 2: 0

## 2024-09-12 NOTE — PATIENT INSTRUCTIONS
Your lab results show A1c down to 6.7% from 6.9%. However, your estimated  glomerular filtration rate is mildly down to 54, which suggest chronic kidney diease stage 3a. We will monitor. I have ordered carotid ultrasound for dizziness. I have also ordered stress echo for exertional dyspnea with referral to consult with cardiology. Complete labs a week prior to 6 months follow up.    High Dose Vitamin A Pregnancy And Lactation Text: High dose vitamin A therapy is contraindicated during pregnancy and breast feeding.

## 2024-09-12 NOTE — PROGRESS NOTES
"Subjective   Reason for Visit: Venkat Mera is an 68 y.o. male here for a Medicare Wellness visit.     Past Medical, Surgical, and Family History reviewed and updated in chart.    Reviewed all medications by prescribing practitioner or clinical pharmacist (such as prescriptions, OTCs, herbal therapies and supplements) and documented in the medical record.    Patient is following up for annual Medicare wellness exam, lab results review and multiple chronic diseases.  His lab results indicate hemoglobin A1c elevated at 6.7% and EGFR low at 54.  He completed a screening colonoscopy on 10/1/2018 with recommendation to repeat for surveillance in 3 to 5 years.  So, he is overdue for repeat colonoscopy.  Advises he is compliant with his medications with no side effect noted.  He presents with complaint of increasing exertional dyspnea and dizziness.  Advises that dizziness is improved when he looks up.  His symptoms have been going on for several months.  However, they have been progressively getting worse.        Patient Care Team:  ELIANE Sheehan-CNP as PCP - General (Family Medicine)     Review of Systems   Respiratory:  Positive for shortness of breath.    Neurological:  Positive for dizziness.   All other systems reviewed and are negative.      Objective   Vitals:  /86   Pulse 77   Ht 1.702 m (5' 7.01\")   Wt 97.5 kg (215 lb)   SpO2 97%   BMI 33.67 kg/m²       Physical Exam  Vitals reviewed.   Constitutional:       Appearance: Normal appearance.   HENT:      Head: Normocephalic and atraumatic.      Right Ear: Tympanic membrane, ear canal and external ear normal.      Left Ear: Tympanic membrane, ear canal and external ear normal.      Nose: Nose normal.      Mouth/Throat:      Mouth: Mucous membranes are moist.   Eyes:      Extraocular Movements: Extraocular movements intact.      Conjunctiva/sclera: Conjunctivae normal.      Pupils: Pupils are equal, round, and reactive to light. "   Cardiovascular:      Rate and Rhythm: Normal rate and regular rhythm.      Pulses: Normal pulses.      Heart sounds: Normal heart sounds.   Pulmonary:      Effort: Pulmonary effort is normal.      Breath sounds: Normal breath sounds.   Abdominal:      General: Bowel sounds are normal.      Palpations: Abdomen is soft.   Musculoskeletal:      Cervical back: Neck supple.   Skin:     General: Skin is warm and dry.   Neurological:      General: No focal deficit present.      Mental Status: He is alert and oriented to person, place, and time.   Psychiatric:         Mood and Affect: Mood normal.         Behavior: Behavior normal.         Thought Content: Thought content normal.         Judgment: Judgment normal.         Assessment & Plan  Medicare annual wellness visit, subsequent    Orders:    Follow Up In [facility] - PCP - Medicare Annual    Primary hypertension         Stage 3a chronic kidney disease (Multi)    Orders:    1 Year Follow Up In [facility] - PCP - Wellness Exam; Future

## 2024-10-03 ENCOUNTER — TELEPHONE (OUTPATIENT)
Dept: PRIMARY CARE | Facility: CLINIC | Age: 69
End: 2024-10-03

## 2024-10-03 ENCOUNTER — HOSPITAL ENCOUNTER (OUTPATIENT)
Dept: CARDIOLOGY | Facility: HOSPITAL | Age: 69
Discharge: HOME | End: 2024-10-03
Payer: MEDICARE

## 2024-10-03 DIAGNOSIS — R06.09 EXERTIONAL DYSPNEA: ICD-10-CM

## 2024-10-03 PROCEDURE — 93321 DOPPLER ECHO F-UP/LMTD STD: CPT

## 2024-10-03 PROCEDURE — 93016 CV STRESS TEST SUPVJ ONLY: CPT | Performed by: INTERNAL MEDICINE

## 2024-10-03 PROCEDURE — 93018 CV STRESS TEST I&R ONLY: CPT | Performed by: INTERNAL MEDICINE

## 2024-10-03 PROCEDURE — 2500000004 HC RX 250 GENERAL PHARMACY W/ HCPCS (ALT 636 FOR OP/ED): Performed by: NURSE PRACTITIONER

## 2024-10-03 PROCEDURE — 93350 STRESS TTE ONLY: CPT | Performed by: INTERNAL MEDICINE

## 2024-10-03 PROCEDURE — 93321 DOPPLER ECHO F-UP/LMTD STD: CPT | Performed by: INTERNAL MEDICINE

## 2024-10-03 NOTE — TELEPHONE ENCOUNTER
----- Message from Humza Serrano sent at 10/3/2024 12:06 PM EDT -----  Please tell patient that his echo stress test is normal.

## 2024-10-11 ENCOUNTER — HOSPITAL ENCOUNTER (OUTPATIENT)
Dept: VASCULAR MEDICINE | Facility: HOSPITAL | Age: 69
Discharge: HOME | End: 2024-10-11
Payer: MEDICARE

## 2024-10-11 DIAGNOSIS — R42 DIZZINESS: ICD-10-CM

## 2024-10-11 PROCEDURE — 93880 EXTRACRANIAL BILAT STUDY: CPT

## 2024-10-14 ENCOUNTER — OFFICE VISIT (OUTPATIENT)
Dept: CARDIOLOGY | Facility: HOSPITAL | Age: 69
End: 2024-10-14
Payer: MEDICARE

## 2024-10-14 ENCOUNTER — HOSPITAL ENCOUNTER (OUTPATIENT)
Dept: RADIOLOGY | Facility: HOSPITAL | Age: 69
Discharge: HOME | End: 2024-10-14
Payer: MEDICARE

## 2024-10-14 VITALS
HEART RATE: 81 BPM | DIASTOLIC BLOOD PRESSURE: 80 MMHG | SYSTOLIC BLOOD PRESSURE: 170 MMHG | BODY MASS INDEX: 33.67 KG/M2 | WEIGHT: 215 LBS

## 2024-10-14 DIAGNOSIS — I10 HYPERTENSION: ICD-10-CM

## 2024-10-14 DIAGNOSIS — R06.02 SHORTNESS OF BREATH: ICD-10-CM

## 2024-10-14 DIAGNOSIS — R93.89 ABNORMAL CAROTID ULTRASOUND: ICD-10-CM

## 2024-10-14 DIAGNOSIS — R06.09 EXERTIONAL DYSPNEA: ICD-10-CM

## 2024-10-14 DIAGNOSIS — I10 HYPERTENSION: Primary | ICD-10-CM

## 2024-10-14 PROCEDURE — 99203 OFFICE O/P NEW LOW 30 MIN: CPT | Performed by: INTERNAL MEDICINE

## 2024-10-14 PROCEDURE — 1036F TOBACCO NON-USER: CPT | Performed by: INTERNAL MEDICINE

## 2024-10-14 PROCEDURE — 1123F ACP DISCUSS/DSCN MKR DOCD: CPT | Performed by: INTERNAL MEDICINE

## 2024-10-14 PROCEDURE — 1159F MED LIST DOCD IN RCRD: CPT | Performed by: INTERNAL MEDICINE

## 2024-10-14 PROCEDURE — 93010 ELECTROCARDIOGRAM REPORT: CPT | Performed by: INTERNAL MEDICINE

## 2024-10-14 PROCEDURE — 3077F SYST BP >= 140 MM HG: CPT | Performed by: INTERNAL MEDICINE

## 2024-10-14 PROCEDURE — 4010F ACE/ARB THERAPY RXD/TAKEN: CPT | Performed by: INTERNAL MEDICINE

## 2024-10-14 PROCEDURE — 71046 X-RAY EXAM CHEST 2 VIEWS: CPT

## 2024-10-14 PROCEDURE — 99213 OFFICE O/P EST LOW 20 MIN: CPT | Mod: 25 | Performed by: INTERNAL MEDICINE

## 2024-10-14 PROCEDURE — 93005 ELECTROCARDIOGRAM TRACING: CPT | Performed by: INTERNAL MEDICINE

## 2024-10-14 PROCEDURE — 3044F HG A1C LEVEL LT 7.0%: CPT | Performed by: INTERNAL MEDICINE

## 2024-10-14 PROCEDURE — 71046 X-RAY EXAM CHEST 2 VIEWS: CPT | Performed by: RADIOLOGY

## 2024-10-14 PROCEDURE — 1157F ADVNC CARE PLAN IN RCRD: CPT | Performed by: INTERNAL MEDICINE

## 2024-10-14 PROCEDURE — 3079F DIAST BP 80-89 MM HG: CPT | Performed by: INTERNAL MEDICINE

## 2024-10-14 PROCEDURE — 3049F LDL-C 100-129 MG/DL: CPT | Performed by: INTERNAL MEDICINE

## 2024-10-14 ASSESSMENT — ENCOUNTER SYMPTOMS
LOSS OF SENSATION IN FEET: 1
DYSPNEA ON EXERTION: 1
OCCASIONAL FEELINGS OF UNSTEADINESS: 1
LIGHT-HEADEDNESS: 1
DEPRESSION: 0
DIZZINESS: 1

## 2024-10-14 NOTE — PROGRESS NOTES
Referred by Dr. Yvette carrington. provider found for Exertional SOB    Counseling:  The patient was counseled regarding diagnostic results, instructions for management, risk factor reductions, prognosis, patient and family education, impressions, risks and benefits of treatment options and importance of compliance with treatment.       History Of Present Illness:    Venkat Mera is a 68 y.o. male patient whose PMH is significant for hyperlipidemia, HTN, DM, GERD, CKD stage 3, and anemia. He presents today to establish cardiovascular care for the evaluation and management of exertional SOB. Exercise stress echo performed 10/03/2024 showed normal LV systolic function and no evidence of ischemia. The patient reports exertional SOB. He also reports intermittent dizziness/lightheadedness. Carotid duplex performed 10/11/2024 showed less than 50% stenosis bilaterally and a turbulent waveform in the right subclavian artery suggestive of proximal stenosis beyond the window of sonographic insonation. The patient denies any RUE pain. The patient's family history is positive for aortic stenosis in his father and paternal aunt, with his father passing away from congestive heart failure as sequelae from aortic stenosis. The patient is currently on amlodipine 5 mg BID, HCTZ 12.5 mg daily and lisinopril 20 mg BID for management of HTN.     Past Surgical History:  He has no past surgical history on file.      Social History:  He reports that he has never smoked. He has never used smokeless tobacco. He reports current alcohol use of about 2.0 standard drinks of alcohol per week. He reports that he does not use drugs.    Family History:  No family history on file.     Allergies:  Bee venom protein (honey bee) and Statins-hmg-coa reductase inhibitors    Outpatient Medications:  Current Outpatient Medications   Medication Instructions    amLODIPine (NORVASC) 5 mg, oral, 2 times daily    ascorbic acid, vitamin C, 500 mg capsule oral     EPINEPHrine 0.3 mg/0.3 mL injection syringe INJECT 0.3 ML INTRAMUSCULARLY AS DIRECTED    esomeprazole (NEXIUM) 40 mg, oral, Daily    hydroCHLOROthiazide (MICROZIDE) 12.5 mg, oral, Daily    lisinopril 20 mg, oral, 2 times daily    multivitamin tablet 1 tablet, oral, Daily    triamcinolone (Kenalog) 0.1 % ointment     valACYclovir (VALTREX) 1,000 mg, oral, Daily        Last Recorded Vitals:  Vitals:    10/14/24 1522   BP: 170/80   BP Location: Left arm   Pulse: 81   Weight: 97.5 kg (215 lb)       Review of Systems   Cardiovascular:  Positive for dyspnea on exertion.   Neurological:  Positive for dizziness and light-headedness.   All other systems reviewed and are negative.     Physical Exam:  Constitutional:       Appearance: Healthy appearance. Not in distress.   Neck:      Vascular: No JVR. JVD normal.      Comments: Right subclavian bruit  Pulmonary:      Effort: Pulmonary effort is normal.      Breath sounds: Normal breath sounds. No wheezing. No rhonchi. No rales.   Chest:      Chest wall: Not tender to palpatation.   Cardiovascular:      PMI at left midclavicular line. Normal rate. Regular rhythm. Normal S1. Normal S2.       Murmurs: There is no murmur.      No gallop.  No click. No rub.   Pulses:     Intact distal pulses.   Edema:     Peripheral edema absent.   Abdominal:      General: Bowel sounds are normal.      Palpations: Abdomen is soft.      Tenderness: There is no abdominal tenderness.   Musculoskeletal: Normal range of motion.         General: No tenderness. Skin:     General: Skin is warm and dry.   Neurological:      General: No focal deficit present.      Mental Status: Alert and oriented to person, place and time.            Last Labs:  CBC -  Lab Results   Component Value Date    WBC 8.7 01/31/2023    HGB 13.5 01/31/2023    HCT 40.7 (L) 01/31/2023    MCV 84 01/31/2023     01/31/2023       CMP -  Lab Results   Component Value Date    CALCIUM 9.4 09/10/2024    PROT 6.7 09/10/2024    ALBUMIN  4.1 09/10/2024    AST 21 09/10/2024    ALT 28 09/10/2024    ALKPHOS 64 09/10/2024    BILITOT 0.5 09/10/2024       LIPID PANEL -   Lab Results   Component Value Date    CHOL 212 (H) 03/11/2024    TRIG 230 (H) 03/11/2024    HDL 41.9 03/11/2024    CHHDL 5.1 03/11/2024    LDLF 153 (H) 09/06/2023    VLDL 46 (H) 03/11/2024    NHDL 170 (H) 03/11/2024       RENAL FUNCTION PANEL -   Lab Results   Component Value Date    GLUCOSE 123 (H) 09/10/2024     09/10/2024    K 4.3 09/10/2024     09/10/2024    CO2 31 09/10/2024    ANIONGAP 10 09/10/2024    BUN 25 (H) 09/10/2024    CREATININE 1.40 (H) 09/10/2024    GFRMALE 59 (A) 09/06/2023    CALCIUM 9.4 09/10/2024    ALBUMIN 4.1 09/10/2024        Lab Results   Component Value Date    HGBA1C 6.7 (H) 09/10/2024       Last Cardiology Tests:  10/11/2024 - Vascular Lab Carotid Artery Duplex    1. Right Carotid: Findings are consistent with less than 50% stenosis of the right proximal internal carotid artery. Laminar flow seen by color Doppler. Right external carotid artery appears patent with no evidence of stenosis. The right vertebral artery is patent with antegrade flow. Turbulent waveform noted in right subclavian artery suggestive of proximal stenosis beyond the window of sonographic insonation.  2. Left Carotid: Findings are consistent with less than 50% stenosis of the left proximal internal carotid artery. Laminar flow seen by color Doppler. Left external carotid artery appears patent with no evidence of stenosis. The left vertebral artery is patent with antegrade flow. No evidence of hemodynamically significant stenosis in the left subclavian artery.    10/03/2024 - Exercise Stress Echo  1. Normal global left ventricular systolic function.  2. Adequate level of stress achieved.  3. No clinical or electrocardiographic evidence for ischemia at maximal workload.  4. There were no stress-induced wall motion abnormalities. This is a negative stress echo test for  ischemia.    Lab review: I have personally reviewed the laboratory result(s).  Diagnostic review: I have personally reviewed the result(s) of the Exercise Stress Echo.    Assessment/Plan   1) Exertional SOB  Exercise stress echo 10/03/2024 with normal LV systolic function and no evidence of ischemia  FH positive for aortic stenosis in father and paternal aunt; father passed away from congestive heart failure as sequelae from aortic stenosis.   Check CXR  Check PFTs  F/U after testing     2) HTN  Elevated   On amlodipine 5 mg BID, HCTZ 12.5 mg daily and lisinopril 20 mg BID     3) Right Subclavian Bruit   Reports intermittent dizziness/lightheadedness  Carotid duplex 10/11/2024 with <50% stenosis bilaterally, turbulent waveform in right subclavian artery suggestive of proximal stenosis beyond the window of sonographic insonation.    Patient denies any RUE pain  Check CTA right upper extremity  F/U after testing      Scribe Attestation  By signing my name below, I, Kendell Strickland   attest that this documentation has been prepared under the direction and in the presence of Bryant Becker MD.

## 2024-10-14 NOTE — LETTER
October 14, 2024     Humza Serrano, APRN-CNP  [address] [facility] [address], [address]  [facility] [id number]    Patient: Venkat Mera   YOB: 1955   Date of Visit: 10/14/2024       Dear Dr. Humza Serrano, APRN-CNP:    Thank you for referring Venkat Mera to me for evaluation. Below are my notes for this consultation.  If you have questions, please do not hesitate to call me. I look forward to following your patient along with you.       Sincerely,     Bryant Becker MD      CC: No Recipients  ______________________________________________________________________________________    Referred by Dr. Crawford ref. provider found for Exertional SOB    Counseling:  The patient was counseled regarding diagnostic results, instructions for management, risk factor reductions, prognosis, patient and family education, impressions, risks and benefits of treatment options and importance of compliance with treatment.       History Of Present Illness:    Venkat Mera is a 68 y.o. male patient whose PMH is significant for hyperlipidemia, HTN, DM, GERD, CKD stage 3, and anemia. He presents today to establish cardiovascular care for the evaluation and management of exertional SOB. Exercise stress echo performed 10/03/2024 showed normal LV systolic function and no evidence of ischemia. The patient reports exertional SOB. He also reports intermittent dizziness/lightheadedness. Carotid duplex performed 10/11/2024 showed less than 50% stenosis bilaterally and a turbulent waveform in the right subclavian artery suggestive of proximal stenosis beyond the window of sonographic insonation. The patient denies any RUE pain. The patient's family history is positive for aortic stenosis in his father and paternal aunt, with his father passing away from congestive heart failure as sequelae from aortic stenosis. The patient is currently on amlodipine 5 mg BID, HCTZ 12.5 mg daily and lisinopril 20 mg BID for  management of HTN.     Past Surgical History:  He has no past surgical history on file.      Social History:  He reports that he has never smoked. He has never used smokeless tobacco. He reports current alcohol use of about 2.0 standard drinks of alcohol per week. He reports that he does not use drugs.    Family History:  No family history on file.     Allergies:  Bee venom protein (honey bee) and Statins-hmg-coa reductase inhibitors    Outpatient Medications:  Current Outpatient Medications   Medication Instructions   • amLODIPine (NORVASC) 5 mg, oral, 2 times daily   • ascorbic acid, vitamin C, 500 mg capsule oral   • EPINEPHrine 0.3 mg/0.3 mL injection syringe INJECT 0.3 ML INTRAMUSCULARLY AS DIRECTED   • esomeprazole (NEXIUM) 40 mg, oral, Daily   • hydroCHLOROthiazide (MICROZIDE) 12.5 mg, oral, Daily   • lisinopril 20 mg, oral, 2 times daily   • multivitamin tablet 1 tablet, oral, Daily   • triamcinolone (Kenalog) 0.1 % ointment    • valACYclovir (VALTREX) 1,000 mg, oral, Daily        Last Recorded Vitals:  Vitals:    10/14/24 1522   BP: 170/80   BP Location: Left arm   Pulse: 81   Weight: 97.5 kg (215 lb)       Review of Systems   Cardiovascular:  Positive for dyspnea on exertion.   Neurological:  Positive for dizziness and light-headedness.   All other systems reviewed and are negative.     Physical Exam:  Constitutional:       Appearance: Healthy appearance. Not in distress.   Neck:      Vascular: No JVR. JVD normal.      Comments: Right subclavian bruit  Pulmonary:      Effort: Pulmonary effort is normal.      Breath sounds: Normal breath sounds. No wheezing. No rhonchi. No rales.   Chest:      Chest wall: Not tender to palpatation.   Cardiovascular:      PMI at left midclavicular line. Normal rate. Regular rhythm. Normal S1. Normal S2.       Murmurs: There is no murmur.      No gallop.  No click. No rub.   Pulses:     Intact distal pulses.   Edema:     Peripheral edema absent.   Abdominal:      General:  Bowel sounds are normal.      Palpations: Abdomen is soft.      Tenderness: There is no abdominal tenderness.   Musculoskeletal: Normal range of motion.         General: No tenderness. Skin:     General: Skin is warm and dry.   Neurological:      General: No focal deficit present.      Mental Status: Alert and oriented to person, place and time.            Last Labs:  CBC -  Lab Results   Component Value Date    WBC 8.7 01/31/2023    HGB 13.5 01/31/2023    HCT 40.7 (L) 01/31/2023    MCV 84 01/31/2023     01/31/2023       CMP -  Lab Results   Component Value Date    CALCIUM 9.4 09/10/2024    PROT 6.7 09/10/2024    ALBUMIN 4.1 09/10/2024    AST 21 09/10/2024    ALT 28 09/10/2024    ALKPHOS 64 09/10/2024    BILITOT 0.5 09/10/2024       LIPID PANEL -   Lab Results   Component Value Date    CHOL 212 (H) 03/11/2024    TRIG 230 (H) 03/11/2024    HDL 41.9 03/11/2024    CHHDL 5.1 03/11/2024    LDLF 153 (H) 09/06/2023    VLDL 46 (H) 03/11/2024    NHDL 170 (H) 03/11/2024       RENAL FUNCTION PANEL -   Lab Results   Component Value Date    GLUCOSE 123 (H) 09/10/2024     09/10/2024    K 4.3 09/10/2024     09/10/2024    CO2 31 09/10/2024    ANIONGAP 10 09/10/2024    BUN 25 (H) 09/10/2024    CREATININE 1.40 (H) 09/10/2024    GFRMALE 59 (A) 09/06/2023    CALCIUM 9.4 09/10/2024    ALBUMIN 4.1 09/10/2024        Lab Results   Component Value Date    HGBA1C 6.7 (H) 09/10/2024       Last Cardiology Tests:  10/11/2024 - Vascular Lab Carotid Artery Duplex    1. Right Carotid: Findings are consistent with less than 50% stenosis of the right proximal internal carotid artery. Laminar flow seen by color Doppler. Right external carotid artery appears patent with no evidence of stenosis. The right vertebral artery is patent with antegrade flow. Turbulent waveform noted in right subclavian artery suggestive of proximal stenosis beyond the window of sonographic insonation.  2. Left Carotid: Findings are consistent with less  than 50% stenosis of the left proximal internal carotid artery. Laminar flow seen by color Doppler. Left external carotid artery appears patent with no evidence of stenosis. The left vertebral artery is patent with antegrade flow. No evidence of hemodynamically significant stenosis in the left subclavian artery.    10/03/2024 - Exercise Stress Echo  1. Normal global left ventricular systolic function.  2. Adequate level of stress achieved.  3. No clinical or electrocardiographic evidence for ischemia at maximal workload.  4. There were no stress-induced wall motion abnormalities. This is a negative stress echo test for ischemia.    Lab review: I have personally reviewed the laboratory result(s).  Diagnostic review: I have personally reviewed the result(s) of the Exercise Stress Echo.    Assessment/Plan  1) Exertional SOB  Exercise stress echo 10/03/2024 with normal LV systolic function and no evidence of ischemia  FH positive for aortic stenosis in father and paternal aunt; father passed away from congestive heart failure as sequelae from aortic stenosis.   Check CXR  Check PFTs  F/U after testing     2) HTN  Elevated   On amlodipine 5 mg BID, HCTZ 12.5 mg daily and lisinopril 20 mg BID     3) Right Subclavian Bruit   Reports intermittent dizziness/lightheadedness  Carotid duplex 10/11/2024 with <50% stenosis bilaterally, turbulent waveform in right subclavian artery suggestive of proximal stenosis beyond the window of sonographic insonation.    Patient denies any RUE pain  Check CTA right upper extremity  F/U after testing      Scribe Attestation  By signing my name below, I, Adela Cristina, Scribe   attest that this documentation has been prepared under the direction and in the presence of Bryant Becker MD.

## 2024-10-14 NOTE — PATIENT INSTRUCTIONS
Continue all current medications as prescribed.   Dr. Becker has ordered the following tests: Chest x-ray, pulmonary function testing, and a CT angiogram of your right upper extremity.  Followup with Dr. Becker after the above tests.    If you have any questions or cardiac concerns, please call our office at [phone].    No Retinal holes, Tears or Detachments.

## 2024-10-16 ENCOUNTER — TELEPHONE (OUTPATIENT)
Dept: PRIMARY CARE | Facility: CLINIC | Age: 69
End: 2024-10-16
Payer: COMMERCIAL

## 2024-10-16 NOTE — TELEPHONE ENCOUNTER
----- Message from Humza Serrano sent at 10/15/2024  8:27 PM EDT -----  Please tell patient that both his right and left carotid artery show less than 50% stenosis which is normal for his age.

## 2024-10-18 ENCOUNTER — APPOINTMENT (OUTPATIENT)
Dept: RADIOLOGY | Facility: HOSPITAL | Age: 69
End: 2024-10-18
Payer: MEDICARE

## 2024-10-18 ENCOUNTER — TELEPHONE (OUTPATIENT)
Dept: CARDIOLOGY | Facility: HOSPITAL | Age: 69
End: 2024-10-18
Payer: COMMERCIAL

## 2024-10-18 DIAGNOSIS — R06.02 SHORTNESS OF BREATH: Primary | ICD-10-CM

## 2024-10-18 DIAGNOSIS — R93.89 ABNORMAL CHEST X-RAY: ICD-10-CM

## 2024-10-18 DIAGNOSIS — I15.9 SECONDARY HYPERTENSION: ICD-10-CM

## 2024-10-18 NOTE — TELEPHONE ENCOUNTER
RN pt at this time with results and plan, RN placed orders at this time. PT verbalized understanding.    ----- Message from Bryant Becker sent at 10/18/2024  9:19 AM EDT -----  Check a High resolution CT chest for abnormal CXR and SOB

## 2024-10-21 ENCOUNTER — APPOINTMENT (OUTPATIENT)
Dept: CARDIOLOGY | Facility: HOSPITAL | Age: 69
End: 2024-10-21
Payer: MEDICARE

## 2024-10-24 ENCOUNTER — LAB (OUTPATIENT)
Dept: LAB | Facility: LAB | Age: 69
End: 2024-10-24
Payer: MEDICARE

## 2024-10-24 DIAGNOSIS — R93.89 ABNORMAL CHEST X-RAY: ICD-10-CM

## 2024-10-24 DIAGNOSIS — R06.02 SHORTNESS OF BREATH: ICD-10-CM

## 2024-10-24 DIAGNOSIS — I15.9 SECONDARY HYPERTENSION: ICD-10-CM

## 2024-10-24 LAB
ALBUMIN SERPL BCP-MCNC: 4.3 G/DL (ref 3.4–5)
ALP SERPL-CCNC: 69 U/L (ref 33–136)
ALT SERPL W P-5'-P-CCNC: 29 U/L (ref 10–52)
ANION GAP SERPL CALC-SCNC: 12 MMOL/L (ref 10–20)
AST SERPL W P-5'-P-CCNC: 21 U/L (ref 9–39)
BILIRUB SERPL-MCNC: 0.4 MG/DL (ref 0–1.2)
BUN SERPL-MCNC: 24 MG/DL (ref 6–23)
CALCIUM SERPL-MCNC: 9.1 MG/DL (ref 8.6–10.3)
CHLORIDE SERPL-SCNC: 100 MMOL/L (ref 98–107)
CO2 SERPL-SCNC: 30 MMOL/L (ref 21–32)
CREAT SERPL-MCNC: 1.37 MG/DL (ref 0.5–1.3)
EGFRCR SERPLBLD CKD-EPI 2021: 55 ML/MIN/1.73M*2
GLUCOSE SERPL-MCNC: 80 MG/DL (ref 74–99)
POTASSIUM SERPL-SCNC: 4.3 MMOL/L (ref 3.5–5.3)
PROT SERPL-MCNC: 6.6 G/DL (ref 6.4–8.2)
SODIUM SERPL-SCNC: 138 MMOL/L (ref 136–145)

## 2024-10-24 PROCEDURE — 36415 COLL VENOUS BLD VENIPUNCTURE: CPT

## 2024-10-24 PROCEDURE — 80053 COMPREHEN METABOLIC PANEL: CPT

## 2024-11-11 ENCOUNTER — TELEPHONE (OUTPATIENT)
Dept: CARDIOLOGY | Facility: HOSPITAL | Age: 69
End: 2024-11-11
Payer: MEDICARE

## 2024-11-11 ENCOUNTER — HOSPITAL ENCOUNTER (OUTPATIENT)
Dept: RADIOLOGY | Facility: HOSPITAL | Age: 69
Discharge: HOME | End: 2024-11-11
Payer: MEDICARE

## 2024-11-11 DIAGNOSIS — R06.02 SHORTNESS OF BREATH: ICD-10-CM

## 2024-11-11 DIAGNOSIS — I10 HYPERTENSION: ICD-10-CM

## 2024-11-11 DIAGNOSIS — R93.89 ABNORMAL CAROTID ULTRASOUND: ICD-10-CM

## 2024-11-11 DIAGNOSIS — R93.89 ABNORMAL CHEST X-RAY: ICD-10-CM

## 2024-11-11 PROCEDURE — 73206 CT ANGIO UPR EXTRM W/O&W/DYE: CPT | Mod: RT

## 2024-11-11 PROCEDURE — 2550000001 HC RX 255 CONTRASTS: Performed by: INTERNAL MEDICINE

## 2024-11-11 PROCEDURE — 71250 CT THORAX DX C-: CPT | Performed by: STUDENT IN AN ORGANIZED HEALTH CARE EDUCATION/TRAINING PROGRAM

## 2024-11-11 PROCEDURE — 71250 CT THORAX DX C-: CPT

## 2024-11-11 NOTE — TELEPHONE ENCOUNTER
Patient came in and talked about his PFT that he went to all his testing and when he went to do his last test the department no showed him and said he can't get it now.     Patient said he would still like to come in Friday to see Rosita to go over the two test that were done and then do the last test and either come back in or a phone call would be great.     I explained to him that Rosita might want the last test done before coming in again but he was addiment that he wanted to keep the appt Friday.    
Initial (On Arrival)

## 2024-11-13 ENCOUNTER — TELEPHONE (OUTPATIENT)
Dept: CARDIOLOGY | Facility: HOSPITAL | Age: 69
End: 2024-11-13
Payer: MEDICARE

## 2024-11-13 DIAGNOSIS — R93.89 ABNORMAL CT SCAN: Primary | ICD-10-CM

## 2024-11-13 DIAGNOSIS — R06.02 SHORTNESS OF BREATH: ICD-10-CM

## 2024-11-13 DIAGNOSIS — I15.9 SECONDARY HYPERTENSION: ICD-10-CM

## 2024-11-13 NOTE — TELEPHONE ENCOUNTER
RN called pt at this time regarding results and plan, Pt verbalized understanding and appointments were scheduled.       ----- Message from Bryant Becker sent at 11/13/2024  9:12 AM EST -----  Order an echo and move his appointment to after echo for possible aortic stenosis

## 2024-11-15 ENCOUNTER — APPOINTMENT (OUTPATIENT)
Dept: CARDIOLOGY | Facility: HOSPITAL | Age: 69
End: 2024-11-15
Payer: MEDICARE

## 2024-11-21 ENCOUNTER — HOSPITAL ENCOUNTER (OUTPATIENT)
Dept: CARDIOLOGY | Facility: HOSPITAL | Age: 69
Discharge: HOME | End: 2024-11-21
Payer: MEDICARE

## 2024-11-21 DIAGNOSIS — R06.02 SHORTNESS OF BREATH: ICD-10-CM

## 2024-11-21 DIAGNOSIS — I10 ESSENTIAL (PRIMARY) HYPERTENSION: ICD-10-CM

## 2024-11-21 DIAGNOSIS — I15.9 SECONDARY HYPERTENSION: ICD-10-CM

## 2024-11-21 LAB
AORTIC VALVE MEAN GRADIENT: 6 MMHG
AORTIC VALVE PEAK VELOCITY: 1.76 M/S
AV PEAK GRADIENT: 12 MMHG
AVA (PEAK VEL): 1.87 CM2
AVA (VTI): 2.12 CM2
EJECTION FRACTION APICAL 4 CHAMBER: 38.9
EJECTION FRACTION: 53 %
LEFT ATRIUM VOLUME AREA LENGTH INDEX BSA: 19.5 ML/M2
LEFT VENTRICLE INTERNAL DIMENSION DIASTOLE: 4.43 CM (ref 3.5–6)
LEFT VENTRICULAR OUTFLOW TRACT DIAMETER: 1.97 CM
LV EJECTION FRACTION BIPLANE: 46 %
MITRAL VALVE E/A RATIO: 0.8
RIGHT VENTRICLE FREE WALL PEAK S': 14.25 CM/S
RIGHT VENTRICLE PEAK SYSTOLIC PRESSURE: 19.5 MMHG
TRICUSPID ANNULAR PLANE SYSTOLIC EXCURSION: 2.4 CM

## 2024-11-21 PROCEDURE — 2500000004 HC RX 250 GENERAL PHARMACY W/ HCPCS (ALT 636 FOR OP/ED): Performed by: INTERNAL MEDICINE

## 2024-11-21 PROCEDURE — 93306 TTE W/DOPPLER COMPLETE: CPT | Performed by: INTERNAL MEDICINE

## 2024-11-21 PROCEDURE — C8929 TTE W OR WO FOL WCON,DOPPLER: HCPCS

## 2024-11-27 ENCOUNTER — HOSPITAL ENCOUNTER (OUTPATIENT)
Dept: RESPIRATORY THERAPY | Facility: HOSPITAL | Age: 69
Discharge: HOME | End: 2024-11-27
Payer: MEDICARE

## 2024-11-27 DIAGNOSIS — I10 HYPERTENSION: ICD-10-CM

## 2024-11-27 DIAGNOSIS — R06.02 SHORTNESS OF BREATH: ICD-10-CM

## 2024-11-27 DIAGNOSIS — R93.89 ABNORMAL CAROTID ULTRASOUND: ICD-10-CM

## 2024-11-27 PROCEDURE — 2500000001 HC RX 250 WO HCPCS SELF ADMINISTERED DRUGS (ALT 637 FOR MEDICARE OP): Performed by: INTERNAL MEDICINE

## 2024-11-27 PROCEDURE — 94726 PLETHYSMOGRAPHY LUNG VOLUMES: CPT

## 2024-11-27 PROCEDURE — 94640 AIRWAY INHALATION TREATMENT: CPT

## 2024-11-27 RX ORDER — ALBUTEROL SULFATE 90 UG/1
4 INHALANT RESPIRATORY (INHALATION) ONCE
Status: COMPLETED | OUTPATIENT
Start: 2024-11-27 | End: 2024-11-27

## 2024-12-01 LAB
MGC ASCENT PFT - FEV1 - POST: 2.46
MGC ASCENT PFT - FEV1 - PRE: 2.36
MGC ASCENT PFT - FEV1 - PREDICTED: 2.73
MGC ASCENT PFT - FVC - POST: 3.8
MGC ASCENT PFT - FVC - PRE: 3.85
MGC ASCENT PFT - FVC - PREDICTED: 3.58

## 2024-12-06 ENCOUNTER — OFFICE VISIT (OUTPATIENT)
Dept: CARDIOLOGY | Facility: HOSPITAL | Age: 69
End: 2024-12-06
Payer: MEDICARE

## 2024-12-06 VITALS
HEIGHT: 67 IN | BODY MASS INDEX: 34.69 KG/M2 | HEART RATE: 85 BPM | DIASTOLIC BLOOD PRESSURE: 70 MMHG | WEIGHT: 221 LBS | SYSTOLIC BLOOD PRESSURE: 134 MMHG

## 2024-12-06 DIAGNOSIS — I10 HYPERTENSION: ICD-10-CM

## 2024-12-06 DIAGNOSIS — E78.2 MIXED HYPERLIPIDEMIA: Primary | ICD-10-CM

## 2024-12-06 DIAGNOSIS — R93.89 ABNORMAL CAROTID ULTRASOUND: ICD-10-CM

## 2024-12-06 DIAGNOSIS — R06.02 SHORTNESS OF BREATH: ICD-10-CM

## 2024-12-06 PROCEDURE — 3008F BODY MASS INDEX DOCD: CPT | Performed by: INTERNAL MEDICINE

## 2024-12-06 PROCEDURE — 3075F SYST BP GE 130 - 139MM HG: CPT | Performed by: INTERNAL MEDICINE

## 2024-12-06 PROCEDURE — 1159F MED LIST DOCD IN RCRD: CPT | Performed by: INTERNAL MEDICINE

## 2024-12-06 PROCEDURE — 3044F HG A1C LEVEL LT 7.0%: CPT | Performed by: INTERNAL MEDICINE

## 2024-12-06 PROCEDURE — 99213 OFFICE O/P EST LOW 20 MIN: CPT | Performed by: INTERNAL MEDICINE

## 2024-12-06 PROCEDURE — 4010F ACE/ARB THERAPY RXD/TAKEN: CPT | Performed by: INTERNAL MEDICINE

## 2024-12-06 PROCEDURE — 1160F RVW MEDS BY RX/DR IN RCRD: CPT | Performed by: INTERNAL MEDICINE

## 2024-12-06 PROCEDURE — 1036F TOBACCO NON-USER: CPT | Performed by: INTERNAL MEDICINE

## 2024-12-06 PROCEDURE — 1123F ACP DISCUSS/DSCN MKR DOCD: CPT | Performed by: INTERNAL MEDICINE

## 2024-12-06 PROCEDURE — 3078F DIAST BP <80 MM HG: CPT | Performed by: INTERNAL MEDICINE

## 2024-12-06 PROCEDURE — 1157F ADVNC CARE PLAN IN RCRD: CPT | Performed by: INTERNAL MEDICINE

## 2024-12-06 PROCEDURE — 3049F LDL-C 100-129 MG/DL: CPT | Performed by: INTERNAL MEDICINE

## 2024-12-06 RX ORDER — FAMOTIDINE 10 MG/ML
20 INJECTION INTRAVENOUS ONCE AS NEEDED
OUTPATIENT
Start: 2024-12-06

## 2024-12-06 RX ORDER — DIPHENHYDRAMINE HYDROCHLORIDE 50 MG/ML
50 INJECTION INTRAMUSCULAR; INTRAVENOUS AS NEEDED
OUTPATIENT
Start: 2024-12-06

## 2024-12-06 RX ORDER — EPINEPHRINE 1 MG/ML
0.3 INJECTION, SOLUTION, CONCENTRATE INTRAVENOUS EVERY 5 MIN PRN
OUTPATIENT
Start: 2024-12-06

## 2024-12-06 RX ORDER — ALBUTEROL SULFATE 0.83 MG/ML
3 SOLUTION RESPIRATORY (INHALATION) AS NEEDED
OUTPATIENT
Start: 2024-12-06

## 2024-12-06 ASSESSMENT — ENCOUNTER SYMPTOMS
WHEEZING: 1
OCCASIONAL FEELINGS OF UNSTEADINESS: 0
DEPRESSION: 0
LOSS OF SENSATION IN FEET: 1

## 2024-12-06 NOTE — PROGRESS NOTES
Counseling:  The patient was counseled regarding diagnostic results, instructions for management, risk factor reductions, prognosis, patient and family education, impressions, risks and benefits of treatment options and importance of compliance with treatment.      Chief Complaint:  The patient presents today for 6-week followup of exertional SOB and right subclavian bruit s/p CTA right upper extremity, CXR, PFTs, echocardiogram and HR CT chest.     History Of Present Illness:    Venkat Mera is a 68 y.o. male patient who presents today for 6-week followup of exertional SOB and right subclavian bruit s/p CTA right upper extremity, CXR, PFTs, echocardiogram and HR CT chest. His PMH is significant for hyperlipidemia, HTN, DM, GERD, CKD stage 3, and anemia. CXR performed 10/14/2024 revealed mild coarsening of the interstitial markings and minimal atelectasis/scarring at the lung bases. Based on this finding, a HR CT chest was ordered. On 11/11/2024, CTA of the right upper extremity revealed tortuosity of the proximal right subclavian artery with an area of approximately 65% focal narrowing and no evidence of atherosclerotic disease or external compression on the subclavian artery, and HR CT chest revealed no evidence of acute pathology with no evidence of interstitial lung disease or expiratory air trapping, and suspected focal aortic valve. Based on the findings of a suspect focal aortic valve on HR CT chest, an echocardiogram was ordered. On 11/21/2024, echocardiogram demonstrated an EF of 50-55%, normal RV systolic function and minimal aortic valve cusp calcification with no evidence of aortic regurgitation. PFTs performed 11/27/2024 revealed mild airflow obstruction without response to Anoro/bronchodilator and normal lung volumes and diffusion capacity. Today, the patient reports persistent exertional SOB with occasional expiratory wheezing. BP on average has been 140/80, per the patient. He indicates that his  "PCP was thinking about switching his amlodipine to clonidine, but did not want to do this until he was seen here today.     Past Surgical History:  He has no past surgical history on file.      Social History:  He reports that he has never smoked. He has never used smokeless tobacco. He reports current alcohol use of about 2.0 standard drinks of alcohol per week. He reports that he does not use drugs.    Family History:  No family history on file.     Allergies:  Bee venom protein (honey bee) and Statins-hmg-coa reductase inhibitors    Outpatient Medications:  Current Outpatient Medications   Medication Instructions    amLODIPine (NORVASC) 5 mg, oral, 2 times daily    ascorbic acid, vitamin C, 500 mg capsule Take by mouth.    EPINEPHrine 0.3 mg/0.3 mL injection syringe INJECT 0.3 ML INTRAMUSCULARLY AS DIRECTED    esomeprazole (NEXIUM) 40 mg, oral, Daily    hydroCHLOROthiazide (MICROZIDE) 12.5 mg, oral, Daily    lisinopril 20 mg, oral, 2 times daily    multivitamin tablet 1 tablet, Daily    triamcinolone (Kenalog) 0.1 % ointment     valACYclovir (VALTREX) 1,000 mg, oral, Daily        Last Recorded Vitals:  Vitals:    12/06/24 1419   BP: 134/70   BP Location: Left arm   Pulse: 85   Weight: 100 kg (221 lb)   Height: 1.702 m (5' 7\")         Review of Systems   Respiratory:  Positive for wheezing.    All other systems reviewed and are negative.     Physical Exam:  Constitutional:       Appearance: Healthy appearance. Not in distress.   Neck:      Vascular: No JVR. JVD normal.      Comments: Right subclavian bruit  Pulmonary:      Effort: Pulmonary effort is normal.      Breath sounds: Normal breath sounds. No wheezing. No rhonchi. No rales.   Chest:      Chest wall: Not tender to palpatation.   Cardiovascular:      PMI at left midclavicular line. Normal rate. Regular rhythm. Normal S1. Normal S2.       Murmurs: There is no murmur.      No gallop.  No click. No rub.   Pulses:     Intact distal pulses.   Edema:     " Peripheral edema absent.   Abdominal:      General: Bowel sounds are normal.      Palpations: Abdomen is soft.      Tenderness: There is no abdominal tenderness.   Musculoskeletal: Normal range of motion.         General: No tenderness. Skin:     General: Skin is warm and dry.   Neurological:      General: No focal deficit present.      Mental Status: Alert and oriented to person, place and time.            Last Labs:  CBC -  Lab Results   Component Value Date    WBC 8.7 01/31/2023    HGB 13.5 01/31/2023    HCT 40.7 (L) 01/31/2023    MCV 84 01/31/2023     01/31/2023       CMP -  Lab Results   Component Value Date    CALCIUM 9.1 10/24/2024    PROT 6.6 10/24/2024    ALBUMIN 4.3 10/24/2024    AST 21 10/24/2024    ALT 29 10/24/2024    ALKPHOS 69 10/24/2024    BILITOT 0.4 10/24/2024       LIPID PANEL -   Lab Results   Component Value Date    CHOL 212 (H) 03/11/2024    TRIG 230 (H) 03/11/2024    HDL 41.9 03/11/2024    CHHDL 5.1 03/11/2024    LDLF 153 (H) 09/06/2023    VLDL 46 (H) 03/11/2024    NHDL 170 (H) 03/11/2024       RENAL FUNCTION PANEL -   Lab Results   Component Value Date    GLUCOSE 80 10/24/2024     10/24/2024    K 4.3 10/24/2024     10/24/2024    CO2 30 10/24/2024    ANIONGAP 12 10/24/2024    BUN 24 (H) 10/24/2024    CREATININE 1.37 (H) 10/24/2024    GFRMALE 59 (A) 09/06/2023    CALCIUM 9.1 10/24/2024    ALBUMIN 4.3 10/24/2024        Lab Results   Component Value Date    HGBA1C 6.7 (H) 09/10/2024       Last Cardiology Tests:  11/27/2024 - PFTs  1. Mild airflow obstruction without response to Anoro/bronchodilator.   2. Normal lung volumes and diffusion capacity.    11/21/2024 - TTE  1. The left ventricular systolic function is low normal, with a visually estimated ejection fraction of 50-55%.  2. There is normal right ventricular global systolic function.  3. The aortic valve is probably trileaflet. There is minimal aortic valve cusp calcification. The aortic valve dimensionless index is  0.69. There is no evidence of aortic valve regurgitation. The peak instantaneous gradient of the aortic valve is 12 mmHg. The mean gradient of the aortic valve is 6 mmHg.     11/11/2024 - HR CT Chest  1. No evidence of acute pathology. No evidence of interstitial lung disease or expiratory air trapping.  2. Suspected focal aortic valve and correlate with concern for aortic stenosis.    11/11/2024 - CTA Right Upper Extremity  Tortuosity of the proximal right subclavian artery with an area of approximately 65% focal narrowing. No evidence of atherosclerotic disease or external compression on the subclavian artery. This finding may simply represent congenital variant anatomy. Correlation with focal symptomatology is recommended to exclude thoracic outlet syndrome.    10/14/2024 - CXR  There is mild coarsening of the interstitial markings. Minimal atelectasis/scarring at the lung bases.    10/11/2024 - Vascular Lab Carotid Artery Duplex    1. Right Carotid: Findings are consistent with less than 50% stenosis of the right proximal internal carotid artery. Laminar flow seen by color Doppler. Right external carotid artery appears patent with no evidence of stenosis. The right vertebral artery is patent with antegrade flow. Turbulent waveform noted in right subclavian artery suggestive of proximal stenosis beyond the window of sonographic insonation.  2. Left Carotid: Findings are consistent with less than 50% stenosis of the left proximal internal carotid artery. Laminar flow seen by color Doppler. Left external carotid artery appears patent with no evidence of stenosis. The left vertebral artery is patent with antegrade flow. No evidence of hemodynamically significant stenosis in the left subclavian artery.    10/03/2024 - Exercise Stress Echo  1. Normal global left ventricular systolic function.  2. Adequate level of stress achieved.  3. No clinical or electrocardiographic evidence for ischemia at maximal workload.  4.  There were no stress-induced wall motion abnormalities. This is a negative stress echo test for ischemia.    Diagnostic review: I have personally reviewed the result(s) of the CXR, HR CT Chest, Echocardiogram, PFTs and CTA Right Upper Extremity.     Assessment/Plan   1) Exertional SOB  Exercise stress echo 10/03/2024 with normal LV systolic function and no evidence of ischemia  FH positive for aortic stenosis in father and paternal aunt; father passed away from congestive heart failure as sequelae from aortic stenosis.   CXR 10/14/2024 with mild coarsening of the interstitial markings and minimal atelectasis/scarring at the lung bases.   HR CT chest 11/11/2024 with no evidence of acute pathology with no evidence of interstitial lung disease or expiratory air trapping, and suspected focal aortic valve.   TTE 11/21/2024 with LVEF 50-55%, normal RV systolic function, minimal aortic valve cusp calcification with no evidence of aortic regurgitation.   PFTs 11/27/2024 with mild airflow obstruction without response to Anoro/bronchodilator and normal lung volumes and diffusion capacity.  Reports persistent exertional SOB with occasional expiratory wheezing     2) HTN  On amlodipine 5 mg BID, HCTZ 12.5 mg daily, lisinopril 20 mg BID   Average BP is 140/90, per patient  Per the patient, PCP was planning on switching amlodipine to clonidine  As BP is within goal of 140/90 or less, would recommend continuing with current medical regimen with no changes.  F/U 1 year     3) Right Subclavian Bruit   Reports intermittent dizziness/lightheadedness  Carotid duplex 10/11/2024 with <50% stenosis bilaterally, turbulent waveform in right subclavian artery suggestive of proximal stenosis beyond the window of sonographic insonation.    CTA right upper extremity 11/11/2024 with tortuosity of the proximal right subclavian artery with an area of approximately 65% focal narrowing and no evidence of atherosclerotic disease or external  compression on the subclavian artery.  Patient denies any RUE pain  F/U 1 year     4) Hyperlipidemia  Lipid panel 03/11/2024 with total cholesterol, LDL and triglycerides of 212, 124 and 230 respectively  Intolerant of statins  Discussed alternatives to statins such as PCSK9 inhibitor   Start Leqvio - will work on insurance approval   F/U 1 year       Scribe Attestation  By signing my name below, I, Kendell Strickland   attest that this documentation has been prepared under the direction and in the presence of Bryant Becker MD.

## 2024-12-06 NOTE — PATIENT INSTRUCTIONS
For your cholesterol, Dr. Becker has prescribed Leqvio, which is an every 6 month infusion. We will work on getting this approved by your insurance.   Continue all other medications as prescribed.   Limit caffeine intake.  Followup with Dr. Becker in 1 year, sooner should any issues or concerns arise before then.     If you have any questions or cardiac concerns, please call our office at [phone].

## 2024-12-06 NOTE — LETTER
December 6, 2024     Humza Serrano, APRN-CNP  [address] [facility] [address], [address]  Formerly [facility], [facility] [address]    Patient: Venkat Mera   YOB: 1955   Date of Visit: 12/6/2024       Dear Dr. Humza Serrano, APRN-CNP:    Thank you for referring Venkat Mera to me for evaluation. Below are my notes for this consultation.  If you have questions, please do not hesitate to call me. I look forward to following your patient along with you.       Sincerely,     Bryant Becker MD      CC: No Recipients  ______________________________________________________________________________________    Counseling:  The patient was counseled regarding diagnostic results, instructions for management, risk factor reductions, prognosis, patient and family education, impressions, risks and benefits of treatment options and importance of compliance with treatment.      Chief Complaint:  The patient presents today for 6-week followup of exertional SOB and right subclavian bruit s/p CTA right upper extremity, CXR, PFTs, echocardiogram and HR CT chest.     History Of Present Illness:    Venkat Mera is a 68 y.o. male patient who presents today for 6-week followup of exertional SOB and right subclavian bruit s/p CTA right upper extremity, CXR, PFTs, echocardiogram and HR CT chest. His PMH is significant for hyperlipidemia, HTN, DM, GERD, CKD stage 3, and anemia. CXR performed 10/14/2024 revealed mild coarsening of the interstitial markings and minimal atelectasis/scarring at the lung bases. Based on this finding, a HR CT chest was ordered. On 11/11/2024, CTA of the right upper extremity revealed tortuosity of the proximal right subclavian artery with an area of approximately 65% focal narrowing and no evidence of atherosclerotic disease or external compression on the subclavian artery, and HR CT chest revealed no evidence of acute pathology with no evidence of interstitial lung disease or expiratory air  "trapping, and suspected focal aortic valve. Based on the findings of a suspect focal aortic valve on HR CT chest, an echocardiogram was ordered. On 11/21/2024, echocardiogram demonstrated an EF of 50-55%, normal RV systolic function and minimal aortic valve cusp calcification with no evidence of aortic regurgitation. PFTs performed 11/27/2024 revealed mild airflow obstruction without response to Anoro/bronchodilator and normal lung volumes and diffusion capacity. Today, the patient reports persistent exertional SOB with occasional expiratory wheezing. BP on average has been 140/80, per the patient. He indicates that his PCP was thinking about switching his amlodipine to clonidine, but did not want to do this until he was seen here today.     Past Surgical History:  He has no past surgical history on file.      Social History:  He reports that he has never smoked. He has never used smokeless tobacco. He reports current alcohol use of about 2.0 standard drinks of alcohol per week. He reports that he does not use drugs.    Family History:  No family history on file.     Allergies:  Bee venom protein (honey bee) and Statins-hmg-coa reductase inhibitors    Outpatient Medications:  Current Outpatient Medications   Medication Instructions   • amLODIPine (NORVASC) 5 mg, oral, 2 times daily   • ascorbic acid, vitamin C, 500 mg capsule Take by mouth.   • EPINEPHrine 0.3 mg/0.3 mL injection syringe INJECT 0.3 ML INTRAMUSCULARLY AS DIRECTED   • esomeprazole (NEXIUM) 40 mg, oral, Daily   • hydroCHLOROthiazide (MICROZIDE) 12.5 mg, oral, Daily   • lisinopril 20 mg, oral, 2 times daily   • multivitamin tablet 1 tablet, Daily   • triamcinolone (Kenalog) 0.1 % ointment    • valACYclovir (VALTREX) 1,000 mg, oral, Daily        Last Recorded Vitals:  Vitals:    12/06/24 1419   BP: 134/70   BP Location: Left arm   Pulse: 85   Weight: 100 kg (221 lb)   Height: 1.702 m (5' 7\")         Review of Systems   Respiratory:  Positive for " wheezing.    All other systems reviewed and are negative.     Physical Exam:  Constitutional:       Appearance: Healthy appearance. Not in distress.   Neck:      Vascular: No JVR. JVD normal.      Comments: Right subclavian bruit  Pulmonary:      Effort: Pulmonary effort is normal.      Breath sounds: Normal breath sounds. No wheezing. No rhonchi. No rales.   Chest:      Chest wall: Not tender to palpatation.   Cardiovascular:      PMI at left midclavicular line. Normal rate. Regular rhythm. Normal S1. Normal S2.       Murmurs: There is no murmur.      No gallop.  No click. No rub.   Pulses:     Intact distal pulses.   Edema:     Peripheral edema absent.   Abdominal:      General: Bowel sounds are normal.      Palpations: Abdomen is soft.      Tenderness: There is no abdominal tenderness.   Musculoskeletal: Normal range of motion.         General: No tenderness. Skin:     General: Skin is warm and dry.   Neurological:      General: No focal deficit present.      Mental Status: Alert and oriented to person, place and time.            Last Labs:  CBC -  Lab Results   Component Value Date    WBC 8.7 01/31/2023    HGB 13.5 01/31/2023    HCT 40.7 (L) 01/31/2023    MCV 84 01/31/2023     01/31/2023       CMP -  Lab Results   Component Value Date    CALCIUM 9.1 10/24/2024    PROT 6.6 10/24/2024    ALBUMIN 4.3 10/24/2024    AST 21 10/24/2024    ALT 29 10/24/2024    ALKPHOS 69 10/24/2024    BILITOT 0.4 10/24/2024       LIPID PANEL -   Lab Results   Component Value Date    CHOL 212 (H) 03/11/2024    TRIG 230 (H) 03/11/2024    HDL 41.9 03/11/2024    CHHDL 5.1 03/11/2024    LDLF 153 (H) 09/06/2023    VLDL 46 (H) 03/11/2024    NHDL 170 (H) 03/11/2024       RENAL FUNCTION PANEL -   Lab Results   Component Value Date    GLUCOSE 80 10/24/2024     10/24/2024    K 4.3 10/24/2024     10/24/2024    CO2 30 10/24/2024    ANIONGAP 12 10/24/2024    BUN 24 (H) 10/24/2024    CREATININE 1.37 (H) 10/24/2024    GFRMALE 59 (A)  09/06/2023    CALCIUM 9.1 10/24/2024    ALBUMIN 4.3 10/24/2024        Lab Results   Component Value Date    HGBA1C 6.7 (H) 09/10/2024       Last Cardiology Tests:  11/27/2024 - PFTs  1. Mild airflow obstruction without response to Anoro/bronchodilator.   2. Normal lung volumes and diffusion capacity.    11/21/2024 - TTE  1. The left ventricular systolic function is low normal, with a visually estimated ejection fraction of 50-55%.  2. There is normal right ventricular global systolic function.  3. The aortic valve is probably trileaflet. There is minimal aortic valve cusp calcification. The aortic valve dimensionless index is 0.69. There is no evidence of aortic valve regurgitation. The peak instantaneous gradient of the aortic valve is 12 mmHg. The mean gradient of the aortic valve is 6 mmHg.     11/11/2024 - HR CT Chest  1. No evidence of acute pathology. No evidence of interstitial lung disease or expiratory air trapping.  2. Suspected focal aortic valve and correlate with concern for aortic stenosis.    11/11/2024 - CTA Right Upper Extremity  Tortuosity of the proximal right subclavian artery with an area of approximately 65% focal narrowing. No evidence of atherosclerotic disease or external compression on the subclavian artery. This finding may simply represent congenital variant anatomy. Correlation with focal symptomatology is recommended to exclude thoracic outlet syndrome.    10/14/2024 - CXR  There is mild coarsening of the interstitial markings. Minimal atelectasis/scarring at the lung bases.    10/11/2024 - Vascular Lab Carotid Artery Duplex    1. Right Carotid: Findings are consistent with less than 50% stenosis of the right proximal internal carotid artery. Laminar flow seen by color Doppler. Right external carotid artery appears patent with no evidence of stenosis. The right vertebral artery is patent with antegrade flow. Turbulent waveform noted in right subclavian artery suggestive of proximal  stenosis beyond the window of sonographic insonation.  2. Left Carotid: Findings are consistent with less than 50% stenosis of the left proximal internal carotid artery. Laminar flow seen by color Doppler. Left external carotid artery appears patent with no evidence of stenosis. The left vertebral artery is patent with antegrade flow. No evidence of hemodynamically significant stenosis in the left subclavian artery.    10/03/2024 - Exercise Stress Echo  1. Normal global left ventricular systolic function.  2. Adequate level of stress achieved.  3. No clinical or electrocardiographic evidence for ischemia at maximal workload.  4. There were no stress-induced wall motion abnormalities. This is a negative stress echo test for ischemia.    Diagnostic review: I have personally reviewed the result(s) of the CXR, HR CT Chest, Echocardiogram, PFTs and CTA Right Upper Extremity.     Assessment/Plan  1) Exertional SOB  Exercise stress echo 10/03/2024 with normal LV systolic function and no evidence of ischemia  FH positive for aortic stenosis in father and paternal aunt; father passed away from congestive heart failure as sequelae from aortic stenosis.   CXR 10/14/2024 with mild coarsening of the interstitial markings and minimal atelectasis/scarring at the lung bases.   HR CT chest 11/11/2024 with no evidence of acute pathology with no evidence of interstitial lung disease or expiratory air trapping, and suspected focal aortic valve.   TTE 11/21/2024 with LVEF 50-55%, normal RV systolic function, minimal aortic valve cusp calcification with no evidence of aortic regurgitation.   PFTs 11/27/2024 with mild airflow obstruction without response to Anoro/bronchodilator and normal lung volumes and diffusion capacity.  Reports persistent exertional SOB with occasional expiratory wheezing     2) HTN  On amlodipine 5 mg BID, HCTZ 12.5 mg daily, lisinopril 20 mg BID   Average BP is 140/90, per patient  Per the patient, PCP was  planning on switching amlodipine to clonidine  As BP is within goal of 140/90 or less, would recommend continuing with current medical regimen with no changes.  F/U 1 year     3) Right Subclavian Bruit   Reports intermittent dizziness/lightheadedness  Carotid duplex 10/11/2024 with <50% stenosis bilaterally, turbulent waveform in right subclavian artery suggestive of proximal stenosis beyond the window of sonographic insonation.    CTA right upper extremity 11/11/2024 with tortuosity of the proximal right subclavian artery with an area of approximately 65% focal narrowing and no evidence of atherosclerotic disease or external compression on the subclavian artery.  Patient denies any RUE pain  F/U 1 year     4) Hyperlipidemia  Lipid panel 03/11/2024 with total cholesterol, LDL and triglycerides of 212, 124 and 230 respectively  Intolerant of statins  Discussed alternatives to statins such as PCSK9 inhibitor   Start Leqvio - will work on insurance approval   F/U 1 year       Scribe Attestation  By signing my name below, I, Kendell Strickland   attest that this documentation has been prepared under the direction and in the presence of Bryant Becker MD.

## 2024-12-19 ENCOUNTER — TELEPHONE (OUTPATIENT)
Dept: CARDIOLOGY | Facility: HOSPITAL | Age: 69
End: 2024-12-19

## 2024-12-19 ENCOUNTER — LAB (OUTPATIENT)
Dept: LAB | Facility: LAB | Age: 69
End: 2024-12-19
Payer: MEDICARE

## 2024-12-19 DIAGNOSIS — E78.2 MIXED HYPERLIPIDEMIA: ICD-10-CM

## 2024-12-19 LAB
CHOLEST SERPL-MCNC: 191 MG/DL (ref 0–199)
CHOLESTEROL/HDL RATIO: 4.7
HDLC SERPL-MCNC: 40.8 MG/DL
LDLC SERPL CALC-MCNC: 119 MG/DL
NON HDL CHOLESTEROL: 150 MG/DL (ref 0–149)
TRIGL SERPL-MCNC: 158 MG/DL (ref 0–149)
VLDL: 32 MG/DL (ref 0–40)

## 2024-12-19 PROCEDURE — 80061 LIPID PANEL: CPT

## 2024-12-19 PROCEDURE — 36415 COLL VENOUS BLD VENIPUNCTURE: CPT

## 2024-12-19 NOTE — TELEPHONE ENCOUNTER
RN called pt at this time. Pt is still waiting for approval for injection. He is still waiting at this time. RN will follow up on it.       ----- Message from Bryant Becker sent at 12/19/2024 12:48 PM EST -----  Regarding: FW:  Make sure he was started on Leqvio  ----- Message -----  From: Lab, Background User  Sent: 12/19/2024  10:27 AM EST  To: Bryant Becker MD

## 2024-12-20 ENCOUNTER — TELEPHONE (OUTPATIENT)
Dept: CARDIOLOGY | Facility: HOSPITAL | Age: 69
End: 2024-12-20
Payer: MEDICARE

## 2024-12-20 NOTE — TELEPHONE ENCOUNTER
RN called pt at this time regarding Infusion center and they stated he does not need a PA for his insurance that he should be good. RN notified Pt.

## 2024-12-23 ENCOUNTER — INFUSION (OUTPATIENT)
Dept: INFUSION THERAPY | Facility: HOSPITAL | Age: 69
End: 2024-12-23
Payer: MEDICARE

## 2024-12-23 VITALS
TEMPERATURE: 97.6 F | RESPIRATION RATE: 20 BRPM | DIASTOLIC BLOOD PRESSURE: 81 MMHG | OXYGEN SATURATION: 96 % | SYSTOLIC BLOOD PRESSURE: 154 MMHG | HEART RATE: 78 BPM

## 2024-12-23 DIAGNOSIS — E78.2 MIXED HYPERLIPIDEMIA: ICD-10-CM

## 2024-12-23 PROCEDURE — 2500000004 HC RX 250 GENERAL PHARMACY W/ HCPCS (ALT 636 FOR OP/ED): Mod: JZ | Performed by: INTERNAL MEDICINE

## 2024-12-23 PROCEDURE — 96372 THER/PROPH/DIAG INJ SC/IM: CPT | Performed by: INTERNAL MEDICINE

## 2024-12-23 RX ORDER — FAMOTIDINE 10 MG/ML
20 INJECTION INTRAVENOUS ONCE AS NEEDED
OUTPATIENT
Start: 2025-03-19

## 2024-12-23 RX ORDER — ASPIRIN 81 MG/1
81 TABLET ORAL
COMMUNITY

## 2024-12-23 RX ORDER — ALBUTEROL SULFATE 0.83 MG/ML
3 SOLUTION RESPIRATORY (INHALATION) AS NEEDED
OUTPATIENT
Start: 2025-03-19

## 2024-12-23 RX ORDER — DIPHENHYDRAMINE HYDROCHLORIDE 50 MG/ML
50 INJECTION INTRAMUSCULAR; INTRAVENOUS AS NEEDED
OUTPATIENT
Start: 2025-03-19

## 2024-12-23 RX ORDER — EPINEPHRINE 0.3 MG/.3ML
0.3 INJECTION SUBCUTANEOUS EVERY 5 MIN PRN
OUTPATIENT
Start: 2025-03-19

## 2024-12-23 RX ORDER — ACETAMINOPHEN 500 MG
5000 TABLET ORAL DAILY
COMMUNITY

## 2024-12-23 SDOH — ECONOMIC STABILITY: FOOD INSECURITY: WITHIN THE PAST 12 MONTHS, THE FOOD YOU BOUGHT JUST DIDN'T LAST AND YOU DIDN'T HAVE MONEY TO GET MORE.: NEVER TRUE

## 2024-12-23 SDOH — ECONOMIC STABILITY: FOOD INSECURITY: WITHIN THE PAST 12 MONTHS, YOU WORRIED THAT YOUR FOOD WOULD RUN OUT BEFORE YOU GOT MONEY TO BUY MORE.: NEVER TRUE

## 2024-12-23 ASSESSMENT — PATIENT HEALTH QUESTIONNAIRE - PHQ9
1. LITTLE INTEREST OR PLEASURE IN DOING THINGS: NOT AT ALL
SUM OF ALL RESPONSES TO PHQ9 QUESTIONS 1 AND 2: 0
2. FEELING DOWN, DEPRESSED OR HOPELESS: NOT AT ALL

## 2024-12-23 ASSESSMENT — ENCOUNTER SYMPTOMS
OCCASIONAL FEELINGS OF UNSTEADINESS: 0
DEPRESSION: 0
LOSS OF SENSATION IN FEET: 1

## 2024-12-23 ASSESSMENT — COLUMBIA-SUICIDE SEVERITY RATING SCALE - C-SSRS
1. IN THE PAST MONTH, HAVE YOU WISHED YOU WERE DEAD OR WISHED YOU COULD GO TO SLEEP AND NOT WAKE UP?: NO
2. HAVE YOU ACTUALLY HAD ANY THOUGHTS OF KILLING YOURSELF?: NO

## 2025-03-05 ENCOUNTER — TELEPHONE (OUTPATIENT)
Dept: CARDIOLOGY | Facility: HOSPITAL | Age: 70
End: 2025-03-05
Payer: MEDICARE

## 2025-03-05 DIAGNOSIS — T46.6X5A ADVERSE REACTION TO STATIN MEDICATION: Primary | ICD-10-CM

## 2025-03-05 NOTE — TELEPHONE ENCOUNTER
Patient called to state he had a reaction to Leqvio injection. His arm went limp, has no strength and they ache. Advised patient not to return for next Leqvio injection.     Patient states he has never tried PCSK9s. Will route to Dr Becker for input on plan.

## 2025-03-05 NOTE — TELEPHONE ENCOUNTER
Patient states he never called Infusion center to let them know about his Sx. They started about 10 days after the injection (In December). States Sx have improved. States weather induced aches and pains currently. Denies any other Sx.     Contacted Nathalia JOLLY to cancel all future Leqvio infusions.     Patient agreeable to Neurology referral. Ref placed.

## 2025-03-09 LAB
ALBUMIN SERPL-MCNC: 4.5 G/DL (ref 3.6–5.1)
ALP SERPL-CCNC: 77 U/L (ref 35–144)
ALT SERPL-CCNC: 40 U/L (ref 9–46)
ANION GAP SERPL CALCULATED.4IONS-SCNC: 11 MMOL/L (CALC) (ref 7–17)
AST SERPL-CCNC: 26 U/L (ref 10–35)
BASOPHILS # BLD AUTO: 77 CELLS/UL (ref 0–200)
BASOPHILS NFR BLD AUTO: 0.9 %
BILIRUB SERPL-MCNC: 0.6 MG/DL (ref 0.2–1.2)
BUN SERPL-MCNC: 25 MG/DL (ref 7–25)
CALCIUM SERPL-MCNC: 9.6 MG/DL (ref 8.6–10.3)
CHLORIDE SERPL-SCNC: 101 MMOL/L (ref 98–110)
CO2 SERPL-SCNC: 29 MMOL/L (ref 20–32)
CREAT SERPL-MCNC: 1.3 MG/DL (ref 0.7–1.28)
EGFRCR SERPLBLD CKD-EPI 2021: 59 ML/MIN/1.73M2
EOSINOPHIL # BLD AUTO: 170 CELLS/UL (ref 15–500)
EOSINOPHIL NFR BLD AUTO: 2 %
ERYTHROCYTE [DISTWIDTH] IN BLOOD BY AUTOMATED COUNT: 13 % (ref 11–15)
EST. AVERAGE GLUCOSE BLD GHB EST-MCNC: 143 MG/DL
EST. AVERAGE GLUCOSE BLD GHB EST-SCNC: 7.9 MMOL/L
GLUCOSE SERPL-MCNC: 124 MG/DL (ref 65–139)
HBA1C MFR BLD: 6.6 % OF TOTAL HGB
HCT VFR BLD AUTO: 41.9 % (ref 38.5–50)
HGB BLD-MCNC: 13.7 G/DL (ref 13.2–17.1)
LYMPHOCYTES # BLD AUTO: 2108 CELLS/UL (ref 850–3900)
LYMPHOCYTES NFR BLD AUTO: 24.8 %
MCH RBC QN AUTO: 28.2 PG (ref 27–33)
MCHC RBC AUTO-ENTMCNC: 32.7 G/DL (ref 32–36)
MCV RBC AUTO: 86.4 FL (ref 80–100)
MONOCYTES # BLD AUTO: 672 CELLS/UL (ref 200–950)
MONOCYTES NFR BLD AUTO: 7.9 %
NEUTROPHILS # BLD AUTO: 5474 CELLS/UL (ref 1500–7800)
NEUTROPHILS NFR BLD AUTO: 64.4 %
PLATELET # BLD AUTO: 374 THOUSAND/UL (ref 140–400)
PMV BLD REES-ECKER: 11.3 FL (ref 7.5–12.5)
POTASSIUM SERPL-SCNC: 3.9 MMOL/L (ref 3.5–5.3)
PROT SERPL-MCNC: 6.7 G/DL (ref 6.1–8.1)
PSA SERPL-MCNC: 1.26 NG/ML
RBC # BLD AUTO: 4.85 MILLION/UL (ref 4.2–5.8)
SODIUM SERPL-SCNC: 141 MMOL/L (ref 135–146)
WBC # BLD AUTO: 8.5 THOUSAND/UL (ref 3.8–10.8)
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
## 2025-03-11 PROBLEM — L98.9 SKIN LESION: Status: RESOLVED | Noted: 2023-09-07 | Resolved: 2025-03-11

## 2025-03-11 PROBLEM — K57.92 ACUTE DIVERTICULITIS: Status: RESOLVED | Noted: 2023-09-07 | Resolved: 2025-03-11

## 2025-03-11 PROBLEM — R53.83 FATIGUE: Status: RESOLVED | Noted: 2023-09-07 | Resolved: 2025-03-11

## 2025-03-11 LAB
ALBUMIN/CREAT UR: 9 MG/G CREAT
APPEARANCE UR: CLEAR
BILIRUB UR QL STRIP: NEGATIVE
COLOR UR: YELLOW
CREAT UR-MCNC: 33 MG/DL (ref 20–320)
GLUCOSE UR QL STRIP: NEGATIVE
HGB UR QL STRIP: NEGATIVE
KETONES UR QL STRIP: NEGATIVE
LEUKOCYTE ESTERASE UR QL STRIP: NEGATIVE
MICROALBUMIN UR-MCNC: 0.3 MG/DL
NITRITE UR QL STRIP: NEGATIVE
PH UR STRIP: 7 [PH] (ref 5–8)
PROT UR QL STRIP: NEGATIVE
SP GR UR STRIP: 1.01 (ref 1–1.03)

## 2025-03-12 ENCOUNTER — APPOINTMENT (OUTPATIENT)
Dept: PRIMARY CARE | Facility: CLINIC | Age: 70
End: 2025-03-12
Payer: MEDICARE

## 2025-03-12 VITALS
WEIGHT: 221 LBS | BODY MASS INDEX: 34.69 KG/M2 | DIASTOLIC BLOOD PRESSURE: 80 MMHG | SYSTOLIC BLOOD PRESSURE: 147 MMHG | OXYGEN SATURATION: 96 % | HEIGHT: 67 IN | HEART RATE: 77 BPM

## 2025-03-12 DIAGNOSIS — K21.9 GASTROESOPHAGEAL REFLUX DISEASE WITHOUT ESOPHAGITIS: ICD-10-CM

## 2025-03-12 DIAGNOSIS — E55.9 VITAMIN D DEFICIENCY: ICD-10-CM

## 2025-03-12 DIAGNOSIS — E78.2 MIXED HYPERLIPIDEMIA: ICD-10-CM

## 2025-03-12 DIAGNOSIS — I10 PRIMARY HYPERTENSION: Primary | ICD-10-CM

## 2025-03-12 DIAGNOSIS — Z11.59 NEED FOR HEPATITIS C SCREENING TEST: ICD-10-CM

## 2025-03-12 DIAGNOSIS — Z91.030 BEE STING ALLERGY: ICD-10-CM

## 2025-03-12 DIAGNOSIS — K43.9 VENTRAL HERNIA WITHOUT OBSTRUCTION OR GANGRENE: ICD-10-CM

## 2025-03-12 DIAGNOSIS — Z00.00 MEDICARE ANNUAL WELLNESS VISIT, SUBSEQUENT: ICD-10-CM

## 2025-03-12 DIAGNOSIS — E11.65 TYPE 2 DIABETES MELLITUS WITH HYPERGLYCEMIA, WITHOUT LONG-TERM CURRENT USE OF INSULIN: ICD-10-CM

## 2025-03-12 DIAGNOSIS — B00.9 HERPES: ICD-10-CM

## 2025-03-12 DIAGNOSIS — N18.31 STAGE 3A CHRONIC KIDNEY DISEASE (MULTI): ICD-10-CM

## 2025-03-12 PROBLEM — E66.09 CLASS 1 OBESITY DUE TO EXCESS CALORIES WITH SERIOUS COMORBIDITY AND BODY MASS INDEX (BMI) OF 33.0 TO 33.9 IN ADULT: Status: RESOLVED | Noted: 2024-03-12 | Resolved: 2025-03-12

## 2025-03-12 PROBLEM — E66.811 CLASS 1 OBESITY DUE TO EXCESS CALORIES WITH SERIOUS COMORBIDITY AND BODY MASS INDEX (BMI) OF 33.0 TO 33.9 IN ADULT: Status: RESOLVED | Noted: 2024-03-12 | Resolved: 2025-03-12

## 2025-03-12 PROCEDURE — 3008F BODY MASS INDEX DOCD: CPT | Performed by: NURSE PRACTITIONER

## 2025-03-12 PROCEDURE — 4010F ACE/ARB THERAPY RXD/TAKEN: CPT | Performed by: NURSE PRACTITIONER

## 2025-03-12 PROCEDURE — 1036F TOBACCO NON-USER: CPT | Performed by: NURSE PRACTITIONER

## 2025-03-12 PROCEDURE — 1123F ACP DISCUSS/DSCN MKR DOCD: CPT | Performed by: NURSE PRACTITIONER

## 2025-03-12 PROCEDURE — 99214 OFFICE O/P EST MOD 30 MIN: CPT | Performed by: NURSE PRACTITIONER

## 2025-03-12 PROCEDURE — 3077F SYST BP >= 140 MM HG: CPT | Performed by: NURSE PRACTITIONER

## 2025-03-12 PROCEDURE — 1159F MED LIST DOCD IN RCRD: CPT | Performed by: NURSE PRACTITIONER

## 2025-03-12 PROCEDURE — 1157F ADVNC CARE PLAN IN RCRD: CPT | Performed by: NURSE PRACTITIONER

## 2025-03-12 PROCEDURE — 3079F DIAST BP 80-89 MM HG: CPT | Performed by: NURSE PRACTITIONER

## 2025-03-12 ASSESSMENT — ANXIETY QUESTIONNAIRES
GAD7 TOTAL SCORE: 0
5. BEING SO RESTLESS THAT IT IS HARD TO SIT STILL: NOT AT ALL
7. FEELING AFRAID AS IF SOMETHING AWFUL MIGHT HAPPEN: NOT AT ALL
6. BECOMING EASILY ANNOYED OR IRRITABLE: NOT AT ALL
1. FEELING NERVOUS, ANXIOUS, OR ON EDGE: NOT AT ALL
3. WORRYING TOO MUCH ABOUT DIFFERENT THINGS: NOT AT ALL
4. TROUBLE RELAXING: NOT AT ALL
2. NOT BEING ABLE TO STOP OR CONTROL WORRYING: NOT AT ALL

## 2025-03-12 ASSESSMENT — ENCOUNTER SYMPTOMS
DEPRESSION: 0
LOSS OF SENSATION IN FEET: 0
OCCASIONAL FEELINGS OF UNSTEADINESS: 0
ROS GI COMMENTS: AS IN HPI

## 2025-03-12 ASSESSMENT — PATIENT HEALTH QUESTIONNAIRE - PHQ9
2. FEELING DOWN, DEPRESSED OR HOPELESS: NOT AT ALL
SUM OF ALL RESPONSES TO PHQ9 QUESTIONS 1 AND 2: 0
1. LITTLE INTEREST OR PLEASURE IN DOING THINGS: NOT AT ALL

## 2025-03-12 NOTE — PATIENT INSTRUCTIONS
I recommend you monitor the lump in your abdomen and notify me if it gets bigger or you develop pain, nausea or unable to move your bowels. Your lab results are unremarkable besides very mild decreased EGFR of 59 and elevated A1c of 6.6%.  Continue taking all current medications as prescribed, complete labs a few days prior to follow-up in 6 months for annual Medicare wellness exam.

## 2025-03-12 NOTE — PROGRESS NOTES
"Subjective   Patient ID: Venkat Mera is a 69 y.o. male who presents for 3 month follow up.    Patient is following up for lab results review and management of hypertension, recurrent herpes, GERD, vitamin D deficiency, bee sting allergies, chronic kidney disease stage IIIa and hyperlipidemia.  His lab results are remarkable beside elevated hemoglobin A1c of 6.6% which indicate controlled diabetes.  Patient is compliant with his prescribed medications.  He is intolerant to statins.  His cardiologist is working on getting leqvio approved by insurance company.  He presents with complaint of a lump above his umbilicus which has been exaggerated by multiple lifting recently.  Denies change in bowel pattern or pain.         Review of Systems   Gastrointestinal:         As in HPI   All other systems reviewed and are negative.      Objective   Ht 1.702 m (5' 7\")   Wt 100 kg (221 lb)   BMI 34.61 kg/m²     Physical Exam  Vitals reviewed.   Constitutional:       Appearance: He is obese.   HENT:      Head: Normocephalic and atraumatic.      Right Ear: External ear normal.      Left Ear: External ear normal.      Nose: Nose normal.      Mouth/Throat:      Mouth: Mucous membranes are moist.   Cardiovascular:      Rate and Rhythm: Normal rate and regular rhythm.      Pulses: Normal pulses.      Heart sounds: Normal heart sounds.   Pulmonary:      Effort: Pulmonary effort is normal.      Breath sounds: Normal breath sounds.   Abdominal:      General: Bowel sounds are normal.      Palpations: Abdomen is soft.      Hernia: A hernia is present.   Musculoskeletal:      Cervical back: Neck supple.   Skin:     General: Skin is warm and dry.   Neurological:      General: No focal deficit present.      Mental Status: He is alert and oriented to person, place, and time.   Psychiatric:         Mood and Affect: Mood normal.         Behavior: Behavior normal.         Thought Content: Thought content normal.         Judgment: Judgment " normal.         Assessment/Plan   Problem List Items Addressed This Visit       Diabetes mellitus (Multi)    Hypertension

## 2025-03-20 ENCOUNTER — TELEPHONE (OUTPATIENT)
Dept: CARDIOLOGY | Facility: HOSPITAL | Age: 70
End: 2025-03-20
Payer: MEDICARE

## 2025-03-20 DIAGNOSIS — E78.2 MIXED HYPERLIPIDEMIA: ICD-10-CM

## 2025-03-20 DIAGNOSIS — T46.6X5A ADVERSE REACTION TO STATIN MEDICATION: Primary | ICD-10-CM

## 2025-03-20 RX ORDER — BEMPEDOIC ACID 180 MG/1
180 TABLET, FILM COATED ORAL DAILY
Qty: 90 TABLET | Refills: 3 | Status: SHIPPED | OUTPATIENT
Start: 2025-03-20 | End: 2026-03-20

## 2025-03-20 NOTE — TELEPHONE ENCOUNTER
RN called pt at this time regarding questions he had about his reaction to Levqio at this time, Pt had called in and spoke with sanya JOLLY regarding muscle aches and weakness. They stopped the medication and referred him to Neurology to the the limp feeling in his arms and weakness. Pt states he is feeling better now and per Dr. Becker his symptoms started just after the infusion - so I wanted him to see Neurology. If he is feeling ok now, then dont have to see. Pt states he is going to hold off on seeing neuro since he is feeling better but wants to know what meds he can try next. RN notified Dr. Becker and RN will give him a call back.     Per Dr. Becker, he wants patient to try Nexletol at this time and see if that would help with some side effects. RN notified pt. Order placed and sent over to Dr. Becker. Pt verbalized understanding.

## 2025-03-20 NOTE — TELEPHONE ENCOUNTER
Patient would like to discuss what the outcome was, as the RN Maria Elena was going to call patient back in regards to cholesterol medication side effects. Was told to hold injection, and was also discussing neurology. Would like to discuss matter further.

## 2025-03-21 ENCOUNTER — TELEPHONE (OUTPATIENT)
Dept: CARDIOLOGY | Facility: HOSPITAL | Age: 70
End: 2025-03-21
Payer: MEDICARE

## 2025-03-21 DIAGNOSIS — E78.2 MIXED HYPERLIPIDEMIA: Primary | ICD-10-CM

## 2025-03-21 DIAGNOSIS — T46.6X5A ADVERSE REACTION TO STATIN MEDICATION: ICD-10-CM

## 2025-03-21 NOTE — TELEPHONE ENCOUNTER
RN called pt at this time regarding the cost out of pocket for Nexletol. Pt states he is unable to afford this medication. RN offered to place a referral at this time to clinical pharmacy to try to help with cost if they qualify. Pt verbalized understanding.

## 2025-03-21 NOTE — TELEPHONE ENCOUNTER
PT called asking to speak to Kathy : she is currently not available right now, let pt know I would send a note over to her to give him a call back.     Routing to Kathy     PT stated this is about a medication they are working on.

## 2025-03-24 ENCOUNTER — APPOINTMENT (OUTPATIENT)
Dept: INFUSION THERAPY | Facility: HOSPITAL | Age: 70
End: 2025-03-24
Payer: MEDICARE

## 2025-03-31 ENCOUNTER — TELEPHONE (OUTPATIENT)
Dept: CARDIOLOGY | Facility: HOSPITAL | Age: 70
End: 2025-03-31
Payer: MEDICARE

## 2025-04-17 ENCOUNTER — TELEPHONE (OUTPATIENT)
Dept: CARDIOLOGY | Facility: HOSPITAL | Age: 70
End: 2025-04-17
Payer: MEDICARE

## 2025-04-17 NOTE — TELEPHONE ENCOUNTER
Pt LVM 4/17 2:11pm requesting to speak to Kathy JOLLY regarding his clinical pharmacy referral - pt states he canceled his appt w/ Clinical Pharmacy as he was told he does not qualify for assistance with Nexletol prescription. Routing to nursing pool for return call for next steps.

## 2025-04-18 NOTE — TELEPHONE ENCOUNTER
4/18/25  1536  Called and spoke with patient regarding nexletol assistance.  Per patient, he is not eligible for assistance due to income threshold, cannot take statins, leqvio, and possibly Praluent/Repatha.    Discussed lovaza/vascepa but per Dr. Becker will not work for him.    Offered to write letter to patient's insurance to request and formulary exception and patient was willing to try that.    Will send letter on 4/21/25.

## 2025-04-21 ENCOUNTER — DOCUMENTATION (OUTPATIENT)
Dept: CARDIOLOGY | Facility: HOSPITAL | Age: 70
End: 2025-04-21
Payer: MEDICARE

## 2025-04-21 NOTE — PROGRESS NOTES
4/21/25  1654  Appeal to [employer] written and to be sent by Calista for formulary exception.

## 2025-04-22 ENCOUNTER — APPOINTMENT (OUTPATIENT)
Dept: PHARMACY | Facility: HOSPITAL | Age: 70
End: 2025-04-22
Payer: MEDICARE

## 2025-04-28 ENCOUNTER — TELEPHONE (OUTPATIENT)
Dept: CARDIOLOGY | Facility: HOSPITAL | Age: 70
End: 2025-04-28
Payer: MEDICARE

## 2025-04-28 NOTE — TELEPHONE ENCOUNTER
4/28/25  0957  Called and spoke to Sathya and Ifrah the supervisor at [facility], to ask why patient was denied Nexletol when he is allergic to statins and had reactions to injectable medications; also reminded Ifrah that patient is allergic to statins when she tried to give alternatives which were statin based.      Appeal form sent to nurse; nurse to fill out.   Conjunctivae and eyelids appear normal,  Sclerae : White without injection

## 2025-04-30 NOTE — TELEPHONE ENCOUNTER
Nexletol 180 mg tablet is approved from 3/21/25 until further notice.  Auth # [id number]    Left patient a voicemail letting him know medication was approved.

## 2025-05-02 ENCOUNTER — TELEPHONE (OUTPATIENT)
Dept: CARDIOLOGY | Facility: HOSPITAL | Age: 70
End: 2025-05-02
Payer: MEDICARE

## 2025-05-02 NOTE — TELEPHONE ENCOUNTER
Pt called in returning voicemail that was left for him. Pt is asking for a call back.    Thanks!  Calista SEVERINO

## 2025-05-12 DIAGNOSIS — K21.9 GASTROESOPHAGEAL REFLUX DISEASE WITHOUT ESOPHAGITIS: ICD-10-CM

## 2025-05-12 RX ORDER — ESOMEPRAZOLE MAGNESIUM 40 MG/1
40 CAPSULE, DELAYED RELEASE ORAL DAILY
Qty: 90 CAPSULE | Refills: 3 | Status: SHIPPED | OUTPATIENT
Start: 2025-05-12

## 2025-05-12 NOTE — TELEPHONE ENCOUNTER
Requested Prescriptions     Pending Prescriptions Disp Refills    esomeprazole (NexIUM) 40 mg DR capsule [Pharmacy Med Name: ESOMEPRAZOLE MAGNESIUM 40MG CPDR] 90 capsule 3     Sig: TAKE ONE CAPSULE BY MOUTH EVERY DAY     Lov 3/12/25    Nov 9/11/25

## 2025-05-13 ENCOUNTER — TELEPHONE (OUTPATIENT)
Dept: CARDIOLOGY | Facility: HOSPITAL | Age: 70
End: 2025-05-13
Payer: MEDICARE

## 2025-05-13 NOTE — TELEPHONE ENCOUNTER
5/13/25  1606  Returned call to patient who informed nurse the co pay for his nexletol was $800 and he could not afford that.    He declined referral to clinical pharmacy as he reported he makes too much to be approved; he also cannot use good Rx.    Both nurse and patient to contact each other if solution is found.

## 2025-05-13 NOTE — TELEPHONE ENCOUNTER
----- Message from Nurse Aracelis UREÑA sent at 5/5/2025  2:04 PM EDT -----  Regarding: Nexletol  Patient called to tell you that he went to  his Nexletol and it was $800. He would like a call back from you specifically. He said he appreciated all that you have been doing to help and would like to continue working with you on this.   NECK PAIN

## 2025-05-31 DIAGNOSIS — I10 PRIMARY HYPERTENSION: ICD-10-CM

## 2025-06-02 RX ORDER — HYDROCHLOROTHIAZIDE 12.5 MG/1
12.5 TABLET ORAL DAILY
Qty: 90 TABLET | Refills: 3 | Status: SHIPPED | OUTPATIENT
Start: 2025-06-02

## 2025-07-06 DIAGNOSIS — B00.9 HERPES: ICD-10-CM

## 2025-07-07 RX ORDER — VALACYCLOVIR HYDROCHLORIDE 1 G/1
1000 TABLET, FILM COATED ORAL DAILY
Qty: 90 TABLET | Refills: 0 | Status: SHIPPED | OUTPATIENT
Start: 2025-07-07

## 2025-09-02 ENCOUNTER — TELEPHONE (OUTPATIENT)
Dept: PRIMARY CARE | Facility: CLINIC | Age: 70
End: 2025-09-02
Payer: MEDICARE

## 2025-09-02 DIAGNOSIS — E11.65 TYPE 2 DIABETES MELLITUS WITH HYPERGLYCEMIA, WITHOUT LONG-TERM CURRENT USE OF INSULIN: Primary | ICD-10-CM

## 2025-09-11 ENCOUNTER — APPOINTMENT (OUTPATIENT)
Dept: PRIMARY CARE | Facility: CLINIC | Age: 70
End: 2025-09-11
Payer: MEDICARE